# Patient Record
Sex: FEMALE | Race: WHITE | NOT HISPANIC OR LATINO | Employment: FULL TIME | URBAN - METROPOLITAN AREA
[De-identification: names, ages, dates, MRNs, and addresses within clinical notes are randomized per-mention and may not be internally consistent; named-entity substitution may affect disease eponyms.]

---

## 2017-01-16 ENCOUNTER — TRANSFERRED RECORDS (OUTPATIENT)
Dept: HEALTH INFORMATION MANAGEMENT | Facility: CLINIC | Age: 52
End: 2017-01-16

## 2017-04-12 ENCOUNTER — OFFICE VISIT (OUTPATIENT)
Dept: FAMILY MEDICINE | Facility: CLINIC | Age: 52
End: 2017-04-12
Payer: COMMERCIAL

## 2017-04-12 VITALS
RESPIRATION RATE: 16 BRPM | DIASTOLIC BLOOD PRESSURE: 78 MMHG | SYSTOLIC BLOOD PRESSURE: 138 MMHG | TEMPERATURE: 97 F | OXYGEN SATURATION: 97 % | HEART RATE: 78 BPM

## 2017-04-12 DIAGNOSIS — Z23 NEED FOR VACCINATION: ICD-10-CM

## 2017-04-12 DIAGNOSIS — Z71.84 TRAVEL ADVICE ENCOUNTER: Primary | ICD-10-CM

## 2017-04-12 PROCEDURE — 90691 TYPHOID VACCINE IM: CPT | Mod: GA | Performed by: NURSE PRACTITIONER

## 2017-04-12 PROCEDURE — 36415 COLL VENOUS BLD VENIPUNCTURE: CPT | Performed by: NURSE PRACTITIONER

## 2017-04-12 PROCEDURE — 86765 RUBEOLA ANTIBODY: CPT | Performed by: NURSE PRACTITIONER

## 2017-04-12 PROCEDURE — 90715 TDAP VACCINE 7 YRS/> IM: CPT | Mod: GA | Performed by: NURSE PRACTITIONER

## 2017-04-12 PROCEDURE — 86735 MUMPS ANTIBODY: CPT | Performed by: NURSE PRACTITIONER

## 2017-04-12 PROCEDURE — 90471 IMMUNIZATION ADMIN: CPT | Mod: GA | Performed by: NURSE PRACTITIONER

## 2017-04-12 PROCEDURE — 90472 IMMUNIZATION ADMIN EACH ADD: CPT | Mod: GA | Performed by: NURSE PRACTITIONER

## 2017-04-12 PROCEDURE — 99402 PREV MED CNSL INDIV APPRX 30: CPT | Mod: 25 | Performed by: NURSE PRACTITIONER

## 2017-04-12 PROCEDURE — 90636 HEP A/HEP B VACC ADULT IM: CPT | Mod: GA | Performed by: NURSE PRACTITIONER

## 2017-04-12 PROCEDURE — 86762 RUBELLA ANTIBODY: CPT | Performed by: NURSE PRACTITIONER

## 2017-04-12 RX ORDER — AZITHROMYCIN 500 MG/1
500 TABLET, FILM COATED ORAL DAILY
Qty: 3 TABLET | Refills: 0 | Status: SHIPPED | OUTPATIENT
Start: 2017-04-12 | End: 2017-04-15

## 2017-04-12 RX ORDER — ACETAZOLAMIDE 125 MG/1
TABLET ORAL
Qty: 20 TABLET | Refills: 0 | Status: ON HOLD | OUTPATIENT
Start: 2017-04-12 | End: 2017-07-11

## 2017-04-12 NOTE — PATIENT INSTRUCTIONS
Today April 12, 2017 you received the    Twinrix (Hepatitis A & B combo) Vaccine - Please return on 5/12/17 for your 2nd dose and 10/9/17 or later for your 3rd and final dose.    Tetanus (Tdap) Vaccine    Typhoid - injectable. This vaccine is valid for two years.     Blood test for Measles, Mumps and Rubella  .    These appointments can be made as a NURSE ONLY visit.    **It is very important for the vaccinations to be given on the scheduled day(s), this helps ensure you receive the full effectiveness of the vaccine.**    Please call [facility] with any questions [phone]    Thank you for visiting [facility]

## 2017-04-12 NOTE — PROGRESS NOTES
Nurse Note      Itinerary:  [place]       Departure Date: 06/17/2017      Return Date: 06/25/2017      Length of Trip 1 week       Reason for Travel: Tourism           Urban or rural: both      Accommodations: Hotel    Hostel        IMMUNIZATION HISTORY  Have you received any immunizations within the past 4 weeks?  No  Have you ever fainted from having your blood drawn or from an injection?  No  Have you ever had a fever reaction to vaccination?  No  Have you ever had any bad reaction or side effect from any vaccination?  No  Have you ever had hepatitis A or B vaccine?  Yes  Do you live (or work closely) with anyone who has AIDS, an AIDS-like condition, any other immune disorder or who is on chemotherapy for cancer?  No  Do you have a family history of immunodeficiency?  No  Have you received any injection of immune globulin or any blood products during the past 12 months?  No    Patient roomed by KRISTIN Castillo  Lilly Diaz is a 52 year old female seen today alone for counsultation for international travel to [place] for Tourism.  Patient will be departing in  2 month(s) and staying for   1 week(s) and  traveling with friemd.      Patient itinerary :  will be in the [facility] (2 days) [facility] region of [place] which presents risk for Rabies, food borne illnesses, motor vehicle accidents and Typhoid. exposure.      Patient's activities will include sightseeing, hiking and high altitude exposure.    Patient's country of birth is [place]    Special medical concerns: none  Pre-travel questionnaire was completed by patient and reviewed by provider.     Vitals: /78  Pulse 78  Temp 97  F (36.1  C) (Oral)  Resp 16  SpO2 97%  BMI= There is no height or weight on file to calculate BMI.    EXAM:  General:  Well-nourished, well-developed in no acute distress.  Appears to be stated age, interacts appropriately and expresses understanding of information given to patient.    Current  Outpatient Prescriptions   Medication Sig Dispense Refill     azithromycin (ZITHROMAX) 500 MG tablet Take 1 tablet (500 mg) by mouth daily for 3 doses Take 1 tablet a day for up to 3 days for severe diarrhea 3 tablet 0     acetaZOLAMIDE (DIAMOX) 125 MG tablet To prevent altitude illness: Take one to two tab every 12 hours starting 24 hours prior to ascent and continue for 48 hours at highest. 20 tablet 0     doxycycline, Rosacea, (ORACEA) 40 MG CPDR Take 40 mg by mouth daily       FLUOXETINE HCL PO Take 10 mg by mouth daily       Multiple Vitamins-Minerals (MULTIVITAMIN ADULT PO)        Cyanocobalamin (VITAMIN B-12 PO)        MAGNESIUM OXIDE PO        Cetirizine HCl (ZYRTEC ALLERGY PO)        Patient Active Problem List   Diagnosis     Osteopenia     Hypercalcemia     Allergies   Allergen Reactions     Amoxicillin          Immunizations discussed include:   Hepatitis A:  Twin Florinda series started today  Hepatitis B: Twin Florinda series started today  Influenza: Declined  Not concerned about risk of disease  Typhoid: Ordered/given today, risks, benefits and side effects reviewed  Rabies: Declined  Not concerned about risk of disease  Yellow Fever: Not indicated  Japanese Encephalitis: Not indicated  Meningococcus: Not indicated  Tetanus/Diphtheria: Up to date  Measles/Mumps/Rubella: titers  Cholera: Not needed  Polio: Up to date  Pneumococcal: Under age of 65  Varicella: Immune by disease history per patient report  Zostavax:  Not indicated  HPV:  Not indicated  TB:  low    Altitude Exposure on this trip: yes    ASSESSMENT/PLAN:    ICD-10-CM    1. Travel advice encounter Z71.89 TYPHOID VACCINE, IM     HEPA/HEPB VACCINE ADULT IM     HEPA/HEPB VACCINE ADULT IM     Rubella Antibody IgG Quantitative     Rubeola Antibody IgG     Mumps Antibody IgG     TDAP VACCINE (ADACEL)     azithromycin (ZITHROMAX) 500 MG tablet     acetaZOLAMIDE (DIAMOX) 125 MG tablet     CANCELED: HEPA VACCINE ADULT IM   2. Need for vaccination Z23 TYPHOID  VACCINE, IM     HEPA/HEPB VACCINE ADULT IM     HEPA/HEPB VACCINE ADULT IM     TDAP VACCINE (ADACEL)     CANCELED: HEPA VACCINE ADULT IM     I have reviewed general recommendations for safe travel   including: food/water precautions, insect precautions, safer sex   practices given high prevalence of Zika, HIV and other STDs,   roadway safety. Educational materials and [employer] report provided.    Malaraia prophylaxis recommended: none  Symptomatic treatment for traveler's diarrhea: azithromycin  Altitude illness prevention and treatment: Diamox with discussion       Evacuation insurance advised and resources were provided to patient.    Total visit time 30 minutes  with over 50% of time spent counseling patient as detailed above.    Yasmine Quispe CNP

## 2017-04-12 NOTE — MR AVS SNAPSHOT
After Visit Summary   4/12/2017    Lilly Diaz    MRN: [id number]           Patient Information     Date Of Birth          1965        Visit Information        Provider Department      4/12/2017 1:45 PM Yasmine Quispe APRN CNP [facility]        Today's Diagnoses     Travel advice encounter    -  1    Need for vaccination          Care Instructions    Today April 12, 2017 you received the    Twinrix (Hepatitis A & B combo) Vaccine - Please return on 5/12/17 for your 2nd dose and 10/9/17 or later for your 3rd and final dose.    Tetanus (Tdap) Vaccine    Typhoid - injectable. This vaccine is valid for two years.     Blood test for Measles, Mumps and Rubella  .    These appointments can be made as a NURSE ONLY visit.    **It is very important for the vaccinations to be given on the scheduled day(s), this helps ensure you receive the full effectiveness of the vaccine.**    Please call [facility] with any questions [phone]    Thank you for visiting [facility]            Follow-ups after your visit        Future tests that were ordered for you today     Open Standing Orders        Priority Remaining Interval Expires Ordered    HEPA/HEPB VACCINE ADULT IM Routine 2/3  3/12/2018 4/12/2017            Who to contact     If you have questions or need follow up information about today's clinic visit or your schedule please contact [facility] directly at [phone].  Normal or non-critical lab and imaging results will be communicated to you by MyChart, letter or phone within 4 business days after the clinic has received the results. If you do not hear from us within 7 days, please contact the clinic through MyChart or phone. If you have a critical or abnormal lab result, we will notify you by phone as soon as possible.  Submit refill requests through [employer] or call your pharmacy and they will forward the refill request to us.  Please allow 3 business days for your refill to be completed.          Additional Information About Your Visit        MyChart Information     [employer] gives you secure access to your electronic health record. If you see a primary care provider, you can also send messages to your care team and make appointments. If you have questions, please call your primary care clinic.  If you do not have a primary care provider, please call [phone] and they will assist you.        Care EveryWhere ID     This is your Care EveryWhere ID. This could be used by other organizations to access your [place] medical records  [id number]        Your Vitals Were     Pulse Temperature Respirations Pulse Oximetry          78 97  F (36.1  C) (Oral) 16 97%         Blood Pressure from Last 3 Encounters:   04/12/17 138/78   09/01/16 127/78    Weight from Last 3 Encounters:   09/01/16 162 lb (73.5 kg)              We Performed the Following     HEPA/HEPB VACCINE ADULT IM     Mumps Antibody IgG     Rubella Antibody IgG Quantitative     Rubeola Antibody IgG     TDAP VACCINE (ADACEL)     TYPHOID VACCINE, IM          Today's Medication Changes          These changes are accurate as of: 4/12/17  3:30 PM.  If you have any questions, ask your nurse or doctor.               Start taking these medicines.        Dose/Directions    acetaZOLAMIDE 125 MG tablet   Commonly known as:  DIAMOX   Used for:  Travel advice encounter   Started by:  Yasmine Quispe APRN CNP        To prevent altitude illness: Take one to two tab every 12 hours starting 24 hours prior to ascent and continue for 48 hours at highest.   Quantity:  20 tablet   Refills:  0       azithromycin 500 MG tablet   Commonly known as:  ZITHROMAX   Used for:  Travel advice encounter   Started by:  Yasmine Quispe APRN CNP        Dose:  500 mg   Take 1 tablet (500 mg) by mouth daily for 3 doses Take 1 tablet a day for up to 3 days for severe diarrhea   Quantity:  3 tablet   Refills:  0             Where to get your medicines      These medications were sent to [id number] - [facility], [place] - [address] AT [facility] [address]  [address], [facility] [address]     Phone:  [phone]     acetaZOLAMIDE 125 MG tablet    azithromycin 500 MG tablet                Primary Care Provider Office Phone # Fax #    Denny Fong [phone]       [facility] [address]  [facility] [address]        Thank you!     Thank you for choosing [facility]  for your care. Our goal is always to provide you with excellent care. Hearing back from our patients is one way we can continue to improve our services. Please take a few minutes to complete the written survey that you may receive in the mail after your visit with us. Thank you!             Your Updated Medication List - Protect others around you: Learn how to safely use, store and throw away your medicines at www.disposemymeds.org.          This list is accurate as of: 4/12/17  3:30 PM.  Always use your most recent med list.                   Brand Name Dispense Instructions for use    acetaZOLAMIDE 125 MG tablet    DIAMOX    20 tablet    To prevent altitude illness: Take one to two tab every 12 hours starting 24 hours prior to ascent and continue for 48 hours at highest.       azithromycin 500 MG tablet    ZITHROMAX    3 tablet    Take 1 tablet (500 mg) by mouth daily for 3 doses Take 1 tablet a day for up to 3 days for severe diarrhea       doxycycline (Rosacea) 40 MG Cpdr CR capsule    ORACEA     Take 40 mg by mouth daily       FLUOXETINE HCL PO      Take 10 mg by mouth daily       MAGNESIUM OXIDE PO          MULTIVITAMIN ADULT PO          VITAMIN B-12 PO          ZYRTEC ALLERGY PO

## 2017-04-12 NOTE — NURSING NOTE
"Chief Complaint   Patient presents with     Travel Clinic     [place]      /78  Pulse 78  Temp 97  F (36.1  C) (Oral)  Resp 16  SpO2 97% Estimated body mass index is 26.35 kg/(m^2) as calculated from the following:    Height as of 9/1/16: 5' 5.75\" (1.67 m).    Weight as of 9/1/16: 162 lb (73.5 kg).  bp completed using cuff size: regular       Health Maintenance addressed:  NONE    n/a    Emily Torres MA     "

## 2017-04-14 LAB
MEV IGG SER QL IA: 1.6 AI (ref 0–0.8)
MUV IGG SER QL IA: 4.3 AI (ref 0–0.8)
RUBV IGG SERPL IA-ACNC: NORMAL IU/ML

## 2017-04-14 RX ORDER — PHENAZOPYRIDINE HYDROCHLORIDE 200 MG/1
200 TABLET, FILM COATED ORAL ONCE
Status: CANCELLED | OUTPATIENT
Start: 2017-04-14 | End: 2017-04-14

## 2017-04-27 ENCOUNTER — OFFICE VISIT (OUTPATIENT)
Dept: FAMILY MEDICINE | Facility: CLINIC | Age: 52
End: 2017-04-27
Payer: COMMERCIAL

## 2017-04-27 VITALS
HEART RATE: 90 BPM | WEIGHT: 172.7 LBS | OXYGEN SATURATION: 98 % | TEMPERATURE: 96.1 F | HEIGHT: 65 IN | DIASTOLIC BLOOD PRESSURE: 78 MMHG | BODY MASS INDEX: 28.78 KG/M2 | SYSTOLIC BLOOD PRESSURE: 145 MMHG

## 2017-04-27 DIAGNOSIS — I47.10 PAROXYSMAL SUPRAVENTRICULAR TACHYCARDIA (H): Primary | ICD-10-CM

## 2017-04-27 DIAGNOSIS — R94.6 THYROID FUNCTION TEST ABNORMAL: ICD-10-CM

## 2017-04-27 PROCEDURE — 99213 OFFICE O/P EST LOW 20 MIN: CPT | Performed by: INTERNAL MEDICINE

## 2017-04-27 NOTE — MR AVS SNAPSHOT
After Visit Summary   4/27/2017    Lilly Diaz    MRN: [id number]           Patient Information     Date Of Birth          1965        Visit Information        Provider Department      4/27/2017 2:00 PM Denny Fong MD [facility]        Today's Diagnoses     Paroxysmal supraventricular tachycardia (H)    -  1    Thyroid function test abnormal           Follow-ups after your visit        Additional Services     CARDIOLOGY EVAL ADULT REFERRAL       Your provider has referred you to:  [facility]: [facility] [phone]   [email or web address]    Please be aware that coverage of these services is subject to the terms and limitations of your health insurance plan.  Call member services at your health plan with any benefit or coverage questions.      Type of Referral:  New EP Consult    Timeframe requested:  Within 1 month    Dr. De La Cruz if possible     Please bring the following to your appointment:  >>   Any x-rays, CTs or MRIs which have been performed.  Contact the facility where they were done to arrange for  prior to your scheduled appointment.    >>   List of current medications  >>   This referral request   >>   Any documents/labs given to you for this referral                  Follow-up notes from your care team     Return in about 2 months (around 6/27/2017) for Routine Visit with thyroid function tests.      Your next 10 appointments already scheduled     May 12, 2017  3:45 PM CDT   Nurse Only with UP NURSE   [facility] ([facility]    [address]  [facility] [address] [phone]            Jul 03, 2017 10:00 AM CDT   [facility] with [facility]ong [facility]   [facility]    [address] [facility] [address]   [phone]            Jul 11, 2017   Procedure with Keesha Dwyer MD   [facility]  "[facility] (--)    [address]  [place] [address] [phone]              Who to contact     If you have questions or need follow up information about today's clinic visit or your schedule please contact [facility] directly at [phone].  Normal or non-critical lab and imaging results will be communicated to you by MyChart, letter or phone within 4 business days after the clinic has received the results. If you do not hear from us within 7 days, please contact the clinic through [employer] or phone. If you have a critical or abnormal lab result, we will notify you by phone as soon as possible.  Submit refill requests through [employer] or call your pharmacy and they will forward the refill request to us. Please allow 3 business days for your refill to be completed.          Additional Information About Your Visit        [employer] Information     [employer] gives you secure access to your electronic health record. If you see a primary care provider, you can also send messages to your care team and make appointments. If you have questions, please call your primary care clinic.  If you do not have a primary care provider, please call [phone] and they will assist you.        Care EveryWhere ID     This is your Care EveryWhere ID. This could be used by other organizations to access your [place] medical records  [id number]        Your Vitals Were     Pulse Temperature Height Pulse Oximetry Breastfeeding? BMI (Body Mass Index)    90 96.1  F (35.6  C) (Tympanic) 5' 5\" (1.651 m) 98% No 28.74 kg/m2       Blood Pressure from Last 3 Encounters:   04/27/17 145/78   04/12/17 138/78   09/01/16 127/78    Weight from Last 3 Encounters:   04/27/17 172 lb 11.2 oz (78.3 kg)   09/01/16 162 lb (73.5 kg)              We Performed the Following     CARDIOLOGY EVAL ADULT REFERRAL        Primary Care Provider Office Phone # Fax #    Denny Fong [phone]       [facility] " [facility] [address]  [facility] [address]        Thank you!     Thank you for choosing [facility]  for your care. Our goal is always to provide you with excellent care. Hearing back from our patients is one way we can continue to improve our services. Please take a few minutes to complete the written survey that you may receive in the mail after your visit with us. Thank you!             Your Updated Medication List - Protect others around you: Learn how to safely use, store and throw away your medicines at www.disposemymeds.org.          This list is accurate as of: 4/27/17  2:37 PM.  Always use your most recent med list.                   Brand Name Dispense Instructions for use    acetaZOLAMIDE 125 MG tablet    DIAMOX    20 tablet    To prevent altitude illness: Take one to two tab every 12 hours starting 24 hours prior to ascent and continue for 48 hours at highest.       doxycycline (Rosacea) 40 MG Cpdr CR capsule    ORACEA     Take 40 mg by mouth daily       FLUOXETINE HCL PO      Take 10 mg by mouth daily       MAGNESIUM OXIDE PO          MULTIVITAMIN ADULT PO          VITAMIN B-12 PO          ZYRTEC ALLERGY PO

## 2017-04-27 NOTE — NURSING NOTE
"Chief Complaint   Patient presents with     Hospital F/U       Initial /78 (BP Location: Right arm, Patient Position: Chair, Cuff Size: Adult Regular)  Pulse 90  Temp 96.1  F (35.6  C) (Tympanic)  Ht 5' 5\" (1.651 m)  Wt 172 lb 11.2 oz (78.3 kg)  SpO2 98%  Breastfeeding? No  BMI 28.74 kg/m2 Estimated body mass index is 28.74 kg/(m^2) as calculated from the following:    Height as of this encounter: 5' 5\" (1.651 m).    Weight as of this encounter: 172 lb 11.2 oz (78.3 kg).  Medication Reconciliation: genevieve Nichols      "

## 2017-04-27 NOTE — PROGRESS NOTES
SUBJECTIVE:                                                    Lilly Diaz is a 52 year old female who presents to clinic today for the following health issues:      ED/UC Followup:    Facility:  [facility]  Date of visit: 2017  Reason for visit:Supraventricular tachycardia    Current Status: Feeling better       This is a very pleasant 52-year-old female who presented to the [facility] emergency department following the sudden onset of palpitations while at rest. She was found to have a narrow complex tachycardia which resolved with vagal maneuvers. Since emergency department discharge, she has felt well without recurrent symptoms. She was also noted to have a mildly elevated TSH of 6.2 while there.  She notes 3 previous episodes of similar symptoms which resolved spontaneously over the last several years.    Problem list and histories reviewed & adjusted, as indicated.  Additional history: as documented    Patient Active Problem List   Diagnosis     Osteopenia     Hypercalcemia     Paroxysmal supraventricular tachycardia (H)     Past Surgical History:   Procedure Laterality Date     wisdom tooth removal         Social History   Substance Use Topics     Smoking status: Never Smoker     Smokeless tobacco: Not on file     Alcohol use 0.0 oz/week     0 Standard drinks or equivalent per week      Comment: once a week     Family History   Problem Relation Age of Onset     Type 2 Diabetes Father      Coronary Artery Disease Mother       52     Ovarian Cancer Sister       40         Current Outpatient Prescriptions   Medication Sig Dispense Refill     acetaZOLAMIDE (DIAMOX) 125 MG tablet To prevent altitude illness: Take one to two tab every 12 hours starting 24 hours prior to ascent and continue for 48 hours at highest. 20 tablet 0     doxycycline, Rosacea, (ORACEA) 40 MG CPDR Take 40 mg by mouth daily       FLUOXETINE HCL PO Take 10 mg by mouth daily       Multiple Vitamins-Minerals  "(MULTIVITAMIN ADULT PO)        Cyanocobalamin (VITAMIN B-12 PO)        MAGNESIUM OXIDE PO        Cetirizine HCl (ZYRTEC ALLERGY PO)        Allergies   Allergen Reactions     Amoxicillin        Reviewed and updated as needed this visit by clinical staff  Tobacco  Allergies  Meds  Soc Hx      Reviewed and updated as needed this visit by Provider         ROS:  Constitutional, HEENT, cardiovascular, pulmonary, gi and gu systems are negative, except as otherwise noted.    OBJECTIVE:                                                    /78 (BP Location: Right arm, Patient Position: Chair, Cuff Size: Adult Regular)  Pulse 90  Temp 96.1  F (35.6  C) (Tympanic)  Ht 5' 5\" (1.651 m)  Wt 172 lb 11.2 oz (78.3 kg)  SpO2 98%  Breastfeeding? No  BMI 28.74 kg/m2  Body mass index is 28.74 kg/(m^2).  GENERAL: healthy, alert and no distress  RESP: lungs clear to auscultation - no rales, rhonchi or wheezes  CV: regular rate and rhythm, normal S1 S2, no S3 or S4, no murmur, click or rub, no peripheral edema and peripheral pulses strong  ABDOMEN: soft, nontender, no hepatosplenomegaly, no masses and bowel sounds normal  MS: no gross musculoskeletal defects noted, no edema  NEURO: Normal strength and tone, mentation intact and speech normal  PSYCH: mentation appears normal, affect normal/bright    Diagnostic Test Results:  Labs from [facility] reviewed showing TSH of 6.2      ASSESSMENT/PLAN:                                                            1. 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 Thyroid function test abnormal  She is scheduled to see me in 2 months for a preop for nephrectomy which would be an appropriate time to recheck the TSH and other thyroid axis blood " tests.      FUTURE APPOINTMENTS:       - She has a preop visit scheduled for July 2017 at which point her thyroid function tests will be rechecked as well; return sooner as needed    Denny Fong MD  [facility]

## 2017-05-12 ENCOUNTER — ALLIED HEALTH/NURSE VISIT (OUTPATIENT)
Dept: NURSING | Facility: CLINIC | Age: 52
End: 2017-05-12
Payer: COMMERCIAL

## 2017-05-12 DIAGNOSIS — Z23 NEED FOR VACCINATION: ICD-10-CM

## 2017-05-12 DIAGNOSIS — Z71.84 TRAVEL ADVICE ENCOUNTER: ICD-10-CM

## 2017-05-12 PROCEDURE — 90636 HEP A/HEP B VACC ADULT IM: CPT | Mod: GA

## 2017-05-12 PROCEDURE — 99207 ZZC NO CHARGE LOS: CPT

## 2017-05-12 PROCEDURE — 90471 IMMUNIZATION ADMIN: CPT | Mod: GA

## 2017-05-12 NOTE — MR AVS SNAPSHOT
After Visit Summary   5/12/2017    Lilly Diaz    MRN: [id number]           Patient Information     Date Of Birth          1965        Visit Information        Provider Department      5/12/2017 3:45 PM UP NURSE [place] [facility]        Today's Diagnoses     Need for vaccination        Travel advice encounter           Follow-ups after your visit        Your next 10 appointments already scheduled     May 25, 2017  1:30 PM CDT   EP NEW with Vito De La Cruz MD   [facility]YS[facility] AT [place] ([facility])    [address]  [facility] [address]   [phone]            Jul 03, 2017 10:00 AM CDT   [facility]    [address] [facility] [address]   [phone]            Jul 11, 2017   Procedure with Keesha Dwyer MD   [facility] PeriOP Services (--)    [address]  [facility] [address] [phone]            Oct 19, 2017 11:30 AM CDT   Nurse Only with UP NURSE   [place] ([facility])    [address]  [facility] [address] [phone]              Who to contact     If you have questions or need follow up information about today's clinic visit or your schedule please contact [facility] NURSE directly at [phone].  Normal or non-critical lab and imaging results will be communicated to you by MyChart, letter or phone within 4 business days after the clinic has received the results. If you do not hear from us within 7 days, please contact the clinic through [employer] or phone. If you have a critical or abnormal lab result, we will notify you by phone as soon as possible.  Submit refill requests through [employer] or call your pharmacy and they will forward the refill request to us. Please allow 3 business days for your refill to be completed.          Additional  Information About Your Visit        [employer] Information     [employer] gives you secure access to your electronic health record. If you see a primary care provider, you can also send messages to your care team and make appointments. If you have questions, please call your primary care clinic.  If you do not have a primary care provider, please call [phone] and they will assist you.        Care EveryWhere ID     This is your Care EveryWhere ID. This could be used by other organizations to access your [facility] medical records  [id number]         Blood Pressure from Last 3 Encounters:   04/27/17 145/78   04/12/17 138/78   09/01/16 127/78    Weight from Last 3 Encounters:   04/27/17 172 lb 11.2 oz (78.3 kg)   09/01/16 162 lb (73.5 kg)              We Performed the Following     HEPA/HEPB VACCINE ADULT IM        Primary Care Provider Office Phone # Fax #    Denny Fong [phone]       [facility] [address]  [facility] [address]        Thank you!     [facility]. Our goal is always to provide you with excellent care. Hearing back from our patients is one way we can continue to improve our services. Please take a few minutes to complete the written survey that you may receive in the mail after your visit with us. Thank you!             Your Updated Medication List - Protect others around you: Learn how to safely use, store and throw away your medicines at www.disposemymeds.org.          This list is accurate as of: 5/12/17  4:05 PM.  Always use your most recent med list.                   Brand Name Dispense Instructions for use    acetaZOLAMIDE 125 MG tablet    DIAMOX    20 tablet    To prevent altitude illness: Take one to two tab every 12 hours starting 24 hours prior to ascent and continue for 48 hours at highest.       doxycycline (Rosacea) 40 MG Cpdr CR capsule    ORACEA     Take 40 mg by mouth daily       FLUOXETINE HCL PO       Take 10 mg by mouth daily       MAGNESIUM OXIDE PO          MULTIVITAMIN ADULT PO          VITAMIN B-12 PO          ZYRTEC ALLERGY PO

## 2017-05-12 NOTE — PROGRESS NOTES
Screening Questionnaire for Adult Immunization    Are you sick today?   No   Do you have allergies to medications, food, a vaccine component or latex?   No   Have you ever had a serious reaction after receiving a vaccination?   No   Do you have a long-term health problem with heart disease, lung disease, asthma, kidney disease, metabolic disease (e.g. diabetes), anemia, or other blood disorder?   No   Do you have cancer, leukemia, HIV/AIDS, or any other immune system problem?   No   In the past 3 months, have you taken medications that affect  your immune system, such as prednisone, other steroids, or anticancer drugs; drugs for the treatment of rheumatoid arthritis, Crohn s disease, or psoriasis; or have you had radiation treatments?   No   Have you had a seizure, or a brain or other nervous system problem?   No   During the past year, have you received a transfusion of blood or blood     products, or been given immune (gamma) globulin or antiviral drug?   No   For women: Are you pregnant or is there a chance you could become        pregnant during the next month?   No   Have you received any vaccinations in the past 4 weeks?   No     Immunization questionnaire answers were all negative.      MNVFC doesn't apply on this patient  Prior to injection verified patient identity using patient's name and date of birth.    Per orders of CATHERINE Quispe, injection of Twinrix  given by Emily Torres. Patient instructed to remain in clinic for 20 minutes afterwards, and to report any adverse reaction to me immediately.       Screening performed by Emily Torres on 5/12/2017 at 4:03 PM.

## 2017-05-25 ENCOUNTER — OFFICE VISIT (OUTPATIENT)
Dept: CARDIOLOGY | Facility: CLINIC | Age: 52
End: 2017-05-25
Attending: INTERNAL MEDICINE
Payer: COMMERCIAL

## 2017-05-25 VITALS
HEART RATE: 82 BPM | SYSTOLIC BLOOD PRESSURE: 136 MMHG | WEIGHT: 174.8 LBS | BODY MASS INDEX: 29.12 KG/M2 | HEIGHT: 65 IN | DIASTOLIC BLOOD PRESSURE: 82 MMHG

## 2017-05-25 DIAGNOSIS — I47.10 PAROXYSMAL SUPRAVENTRICULAR TACHYCARDIA (H): Primary | ICD-10-CM

## 2017-05-25 PROCEDURE — 99204 OFFICE O/P NEW MOD 45 MIN: CPT | Mod: 25 | Performed by: INTERNAL MEDICINE

## 2017-05-25 PROCEDURE — 93000 ELECTROCARDIOGRAM COMPLETE: CPT | Performed by: INTERNAL MEDICINE

## 2017-05-25 RX ORDER — CEPHRADINE 500 MG
CAPSULE ORAL DAILY
COMMUNITY

## 2017-05-25 RX ORDER — DILTIAZEM HCL 60 MG
TABLET ORAL
Qty: 5 TABLET | Refills: 0 | Status: SHIPPED | OUTPATIENT
Start: 2017-05-25 | End: 2018-04-19

## 2017-05-25 NOTE — PROGRESS NOTES
HPI and Plan:   See dictation    Orders Placed This Encounter   Procedures     EKG 12-lead complete w/read - Clinics (performed today)     Echocardiogram       Orders Placed This Encounter   Medications     Omega-3 Fatty Acids (FISH OIL) 1200 MG CPDR     Sig: Take 1,200 mg by mouth     magnesium 500 MG TABS     Alpha-Lipoic Acid 200 MG CAPS     diltiazem (CARDIZEM) 60 MG tablet     Sig: Take 1 tab as needed for SVT     Dispense:  5 tablet     Refill:  0       There are no discontinued medications.      Encounter Diagnosis   Name Primary?     Paroxysmal supraventricular tachycardia (H) Yes       CURRENT MEDICATIONS:  Current Outpatient Prescriptions   Medication Sig Dispense Refill     Omega-3 Fatty Acids (FISH OIL) 1200 MG CPDR Take 1,200 mg by mouth       magnesium 500 MG TABS        Alpha-Lipoic Acid 200 MG CAPS        diltiazem (CARDIZEM) 60 MG tablet Take 1 tab as needed for SVT 5 tablet 0     acetaZOLAMIDE (DIAMOX) 125 MG tablet To prevent altitude illness: Take one to two tab every 12 hours starting 24 hours prior to ascent and continue for 48 hours at highest. 20 tablet 0     doxycycline, Rosacea, (ORACEA) 40 MG CPDR Take 40 mg by mouth daily       FLUOXETINE HCL PO Take 10 mg by mouth daily       Multiple Vitamins-Minerals (MULTIVITAMIN ADULT PO)        Cyanocobalamin (VITAMIN B-12 PO)        MAGNESIUM OXIDE PO        Cetirizine HCl (ZYRTEC ALLERGY PO)          ALLERGIES     Allergies   Allergen Reactions     Amoxicillin        PAST MEDICAL HISTORY:  Past Medical History:   Diagnosis Date     Menopausal hot flushes     she takes prozac for that     Rosacea     she takes doxycycline for that       PAST SURGICAL HISTORY:  Past Surgical History:   Procedure Laterality Date     wisdom tooth removal         FAMILY HISTORY:  Family History   Problem Relation Age of Onset     Type 2 Diabetes Father      Coronary Artery Disease Mother       52     Ovarian Cancer Sister       40       SOCIAL HISTORY:  Social  "History     Social History     Marital status:      Spouse name: N/A     Number of children: N/A     Years of education: N/A     Social History Main Topics     Smoking status: Never Smoker     Smokeless tobacco: Never Used     Alcohol use 0.0 oz/week     0 Standard drinks or equivalent per week      Comment: once a week     Drug use: No     Sexual activity: Yes     Partners: Male     Other Topics Concern     Parent/Sibling W/ Cabg, Mi Or Angioplasty Before 65f 55m? 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:  Skin:  Negative       Eyes:  Negative      ENT:  Negative      Respiratory:  Positive for cough;shortness of breath (SOB w/palpitations ) r/t allergies    Cardiovascular:    Positive for;palpitations;lightheadedness    Gastroenterology: Positive for   abd \"tightness\" w/palpitations   Genitourinary:  Negative      Musculoskeletal:  Negative      Neurologic:  Negative      Psychiatric:  Positive for sleep disturbances;anxiety    Heme/Lymph/Imm:  Negative      Endocrine:  Positive for hot flashes (Elevated TSH in ED, PMD to recheck in July 2017) patient r/t menopause     [id number]      Denny Fong MD  [facility]  [address] [address]              "

## 2017-05-25 NOTE — LETTER
2017    Denny Fong MD  [address] [place] [address]    RE: Lilly Diaz       Dear Colleague,    I had the pleasure of seeing Lilly Diaz in the [facility].    It was my pleasure seeing your patient, Ms. Lilly Diaz, for evaluation of SVT.  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  The patient was 90 minutes into an episode of profound heart racing and pounding when she decided to go to the ER.  She was mildly lightheaded.   In the ER, she was described to have a regular narrow QRS tachycardia in the 190s.  The episode terminated with Valsalva maneuvers.  I do not have a 12-lead ECG.  The patient was asked to follow up with you.  Lilly had at least 3 or 4 similar episodes over the past 2 or 3 years.  Each of the previous episodes terminated spontaneously within minutes.      She is active.  In fact, a few years ago she was extremely active and as recently as in  she ran a full marathon.  Since menopause she has decreased her activity level.  She is scheduled to have prophylactic oophorectomy in July.      She is a non-smoker and drinks alcohol socially.  She drinks very modest amounts of caffeinated beverages.      Lilly's mother had a heart attack and  in her early 50s.  She was a smoker.      PHYSICAL EXAMINATION:   VITAL SIGNS:  Blood pressure 136/82, pulse 82 and regular, weight 79 kg, height 165 cm.   GENERAL:  She is a very pleasant, healthy-appearing female in no distress.   HEENT:  Normocephalic, atraumatic.  Sclerae nonicteric.  Mouth, oropharynx clear.   NECK:  Supple, without thyromegaly, lymphadenopathy or carotid bruits.   LUNGS:  Clear.  No crackles or wheezes.   CARDIOVASCULAR:  Normal JVP, regular rhythm.  No gallop, murmur or rub.   ABDOMEN:  Soft, nontender.   EXTREMITIES:  No edema.   SKIN:  No rash.   BACK:  No CVA tenderness.    NEUROLOGIC:  Alert and oriented x3.      DIAGNOSTIC STUDIES:    Her 12-lead ECG today showed sinus rhythm with RSR' in lead V1, otherwise normal.   She had recent laboratory tests showing sodium 139, potassium 4.1, creatinine 0.86, , HDL 46, cholesterol 195, triglycerides 112.   She recently had a heart scan with a calcium score of 0.     Outpatient Encounter Prescriptions as of 5/25/2017   Medication Sig Dispense Refill     Omega-3 Fatty Acids (FISH OIL) 1200 MG CPDR Take 1,200 mg by mouth       magnesium 500 MG TABS Take 1 tablet by mouth daily        Alpha-Lipoic Acid 200 MG CAPS        diltiazem (CARDIZEM) 60 MG tablet Take 1 tab as needed for SVT 5 tablet 0     [DISCONTINUED] acetaZOLAMIDE (DIAMOX) 125 MG tablet To prevent altitude illness: Take one to two tab every 12 hours starting 24 hours prior to ascent and continue for 48 hours at highest. 20 tablet 0     doxycycline, Rosacea, (ORACEA) 40 MG CPDR Take 40 mg by mouth daily       FLUOXETINE HCL PO Take 10 mg by mouth daily       Multiple Vitamins-Minerals (MULTIVITAMIN ADULT PO) Take 1 tablet by mouth daily        Cyanocobalamin (VITAMIN B-12 PO) Take 1,000 mcg by mouth daily        Cetirizine HCl (ZYRTEC ALLERGY PO) Take 10 mg by mouth daily        [DISCONTINUED] MAGNESIUM OXIDE PO        No facility-administered encounter medications on file as of 5/25/2017.       IMPRESSION:     1.  Paroxysmal supraventricular tachycardia.  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  She had a prolonged and symptomatic episode recently.  She had previous shorter episodes that terminated within minutes.  We had a good discussion about the pathophysiology and treatment options for SVT.  We discussed the following three options:   a.  Observation and application of Valsalva maneuvers if she has a recurrence.   b.  Daily pharmacologic therapy with a calcium channel blocker or beta blocker.   c.  Catheter ablation of SVT.  With regard to the EP procedure, we went over the  technical aspects, risks and benefits of catheter ablation.  I quoted a greater than 95% likelihood of cure of reentrant SVT with a single procedure with the risk of serious complication of 1% to 2%.  Potential complications include, but are not limited to vascular injury, DVT, bleeding, cardiac perforation with tamponade, injury to the conduction system requiring pacemaker implantation, TIA or CVA if left-sided ablation is required and other unforeseen complications.      Lilly had several good questions.  She is leaning toward catheter ablation but she wanted to further discuss this with her family.      RECOMMENDATIONS:   A.  Echocardiogram.   B.  She is leaving for a trip to [place] in mid June.  I prescribed short-acting diltiazem 60 mg tablets to have available and take as needed in case she develops sustained SVT during her trip.      It was my pleasure seeing this delightful patient.  Please feel free to contact me with any additional questions.     Sincerely,    Vito De La Cruz MD     [facility]

## 2017-05-25 NOTE — MR AVS SNAPSHOT
After Visit Summary   5/25/2017    Lilly Diaz    MRN: [id number]           Patient Information     Date Of Birth          1965        Visit Information        Provider Department      5/25/2017 1:30 PM Vito De La Cruz MD [facility]YS[facility] AT [place]        Today's Diagnoses     Paroxysmal supraventricular tachycardia (H)    -  1       Follow-ups after your visit        Your next 10 appointments already scheduled     Jun 02, 2017  3:00 PM CDT   Ech Complete with [id number]   [facility] CV Echocardiography (Cardiovascular Imaging at [facility])    [address] [facility]  [address]  [facility] [address] [phone]           1.  Please bring or wear a comfortable two-piece outfit. 2.  You may eat, drink and take your normal medicines. 3.  For any questions that cannot be answered, please contact the ordering physician            Jul 03, 2017 10:00 AM CDT   Pre-Op physical with Denny Fong MD   [facility]    [address] [facility] [address] [phone]            Jul 11, 2017   Procedure with Keesha Dwyer MD   [facility] PeriOP Services (--)    [address]  [facility] [address] [phone]            Oct 19, 2017 11:30 AM CDT   Nurse Only with UP NURSE   [place] ([facility])    [address]  [facility] [address] [phone]              Future tests that were ordered for you today     Open Future Orders        Priority Expected Expires Ordered    Echocardiogram Routine 6/1/2017 5/25/2018 5/25/2017            Who to contact     If you have questions or need follow up information about today's clinic visit or your schedule please contact [facility] [place] directly at [phone].  Normal or non-critical lab and imaging results will be communicated to you by Annabel  "letter or phone within 4 business days after the clinic has received the results. If you do not hear from us within 7 days, please contact the clinic through [employer] or phone. If you have a critical or abnormal lab result, we will notify you by phone as soon as possible.  Submit refill requests through [employer] or call your pharmacy and they will forward the refill request to us. Please allow 3 business days for your refill to be completed.          Additional Information About Your Visit        [employer] Information     [employer] gives you secure access to your electronic health record. If you see a primary care provider, you can also send messages to your care team and make appointments. If you have questions, please call your primary care clinic.  If you do not have a primary care provider, please call [phone] and they will assist you.        Care EveryWhere ID     This is your Care EveryWhere ID. This could be used by other organizations to access your [facility] medical records  [id number]        Your Vitals Were     Pulse Height BMI (Body Mass Index)             82 1.651 m (5' 5\") 29.09 kg/m2          Blood Pressure from Last 3 Encounters:   05/25/17 136/82   04/27/17 145/78   04/12/17 138/78    Weight from Last 3 Encounters:   05/25/17 79.3 kg (174 lb 12.8 oz)   04/27/17 78.3 kg (172 lb 11.2 oz)   09/01/16 73.5 kg (162 lb)              We Performed the Following     EKG 12-lead complete w/read - Clinics (performed today)          Today's Medication Changes          These changes are accurate as of: 5/25/17  2:07 PM.  If you have any questions, ask your nurse or doctor.               Start taking these medicines.        Dose/Directions    diltiazem 60 MG tablet   Commonly known as:  CARDIZEM   Used for:  Paroxysmal supraventricular tachycardia (H)   Started by:  Vito De La Cruz MD        Take 1 tab as needed for SVT   Quantity:  5 tablet   Refills:  0            Where to get your medicines    "   These medications were sent to [id number] - [place], [place] - [address] [facility] [address]  [address], [facility] [address]     Phone:  [phone]     diltiazem 60 MG tablet                Primary Care Provider Office Phone # Fax #    Denny Fong [phone]       [place] [id number] [facility] [address]        Thank you!     Thank you for choosing [facility]YSIC[facility] HE[facility] AT [place]  for your care. Our goal is always to provide you with excellent care. Hearing back from our patients is one way we can continue to improve our services. Please take a few minutes to complete the written survey that you may receive in the mail after your visit with us. Thank you!             Your Updated Medication List - Protect others around you: Learn how to safely use, store and throw away your medicines at www.disposemymeds.org.          This list is accurate as of: 5/25/17  2:07 PM.  Always use your most recent med list.                   Brand Name Dispense Instructions for use    acetaZOLAMIDE 125 MG tablet    DIAMOX    20 tablet    To prevent altitude illness: Take one to two tab every 12 hours starting 24 hours prior to ascent and continue for 48 hours at highest.       Alpha-Lipoic Acid 200 MG Caps          diltiazem 60 MG tablet    CARDIZEM    5 tablet    Take 1 tab as needed for SVT       doxycycline (Rosacea) 40 MG Cpdr CR capsule    ORACEA     Take 40 mg by mouth daily       Fish Oil 1200 MG Cpdr      Take 1,200 mg by mouth       FLUOXETINE HCL PO      Take 10 mg by mouth daily       magnesium 500 MG Tabs          MAGNESIUM OXIDE PO          MULTIVITAMIN ADULT PO          VITAMIN B-12 PO          ZYRTEC ALLERGY PO

## 2017-05-26 NOTE — PROGRESS NOTES
HISTORY OF PRESENT ILLNESS:    It was my pleasure seeing your patient, Ms. Lilly Diaz, for evaluation of SVT.  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  The patient was 90 minutes into an episode of profound heart racing and pounding when she decided to go to the ER.  She was mildly lightheaded.   In the ER, she was described to have a regular narrow QRS tachycardia in the 190s.  The episode terminated with Valsalva maneuvers.  I do not have a 12-lead ECG.  The patient was asked to follow up with you.  Lilly had at least 3 or 4 similar episodes over the past 2 or 3 years.  Each of the previous episodes terminated spontaneously within minutes.      She is active.  In fact, a few years ago she was extremely active and as recently as in  she ran a full marathon.  Since menopause she has decreased her activity level.  She is scheduled to have prophylactic oophorectomy in July.      She is a non-smoker and drinks alcohol socially.  She drinks very modest amounts of caffeinated beverages.      Lilly's mother had a heart attack and  in her early 50s.  She was a smoker.      PHYSICAL EXAMINATION:   VITAL SIGNS:  Blood pressure 136/82, pulse 82 and regular, weight 79 kg, height 165 cm.   GENERAL:  She is a very pleasant, healthy-appearing female in no distress.   HEENT:  Normocephalic, atraumatic.  Sclerae nonicteric.  Mouth, oropharynx clear.   NECK:  Supple, without thyromegaly, lymphadenopathy or carotid bruits.   LUNGS:  Clear.  No crackles or wheezes.   CARDIOVASCULAR:  Normal JVP, regular rhythm.  No gallop, murmur or rub.   ABDOMEN:  Soft, nontender.   EXTREMITIES:  No edema.   SKIN:  No rash.   BACK:  No CVA tenderness.   NEUROLOGIC:  Alert and oriented x3.      DIAGNOSTIC STUDIES:    Her 12-lead ECG today showed sinus rhythm with RSR' in lead V1, otherwise normal.   She had recent laboratory tests showing sodium  139, potassium 4.1, creatinine 0.86, , HDL 46, cholesterol 195, triglycerides 112.   She recently had a heart scan with a calcium score of 0.      IMPRESSION:     1.  Paroxysmal supraventricular tachycardia.  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  She had a prolonged and symptomatic episode recently.  She had previous shorter episodes that terminated within minutes.  We had a good discussion about the pathophysiology and treatment options for SVT.  We discussed the following three options:   a.  Observation and application of Valsalva maneuvers if she has a recurrence.   b.  Daily pharmacologic therapy with a calcium channel blocker or beta blocker.   c.  Catheter ablation of SVT.  With regard to the EP procedure, we went over the technical aspects, risks and benefits of catheter ablation.  I quoted a greater than 95% likelihood of cure of reentrant SVT with a single procedure with the risk of serious complication of 1% to 2%.  Potential complications include, but are not limited to vascular injury, DVT, bleeding, cardiac perforation with tamponade, injury to the conduction system requiring pacemaker implantation, TIA or CVA if left-sided ablation is required and other unforeseen complications.      Lilly had several good questions.  She is leaning toward catheter ablation but she wanted to further discuss this with her family.      RECOMMENDATIONS:   A.  Echocardiogram.   B.  She is leaving for a trip to [place] in mid June.  I prescribed short-acting diltiazem 60 mg tablets to have available and take as needed in case she develops sustained SVT during her trip.      It was my pleasure seeing this delightful patient.  Please feel free to contact me with any additional questions.        CONSUELO GODINEZ MD, [facility]         cc:   Denny Fong MD   [address]  [address]          D: 05/25/2017 14:13   T: 05/26/2017 11:47   MT: LAWRENCE      Name:      YAYA LOLLY   MRN:      [id number]        Account:      [id number]   :      1965           Service Date: 2017      Document: [id number]

## 2017-06-02 ENCOUNTER — HOSPITAL ENCOUNTER (OUTPATIENT)
Dept: CARDIOLOGY | Facility: CLINIC | Age: 52
Discharge: HOME | End: 2017-06-02
Attending: INTERNAL MEDICINE | Admitting: INTERNAL MEDICINE
Payer: COMMERCIAL

## 2017-06-02 DIAGNOSIS — I47.10 PAROXYSMAL SUPRAVENTRICULAR TACHYCARDIA (H): ICD-10-CM

## 2017-06-02 PROCEDURE — 93306 TTE W/DOPPLER COMPLETE: CPT | Mod: 26 | Performed by: INTERNAL MEDICINE

## 2017-06-02 PROCEDURE — 93306 TTE W/DOPPLER COMPLETE: CPT

## 2017-06-05 ENCOUNTER — TELEPHONE (OUTPATIENT)
Dept: CARDIOLOGY | Facility: CLINIC | Age: 52
End: 2017-06-05

## 2017-06-05 NOTE — TELEPHONE ENCOUNTER
Message  Received: Yesterday       Vito De La Cruz MD  [facility] Heart Ep Nurse                     Normal echo-.   Please let her know.   JONATHAN Wolf       Writer called pt with results as above and she verbalized understanding. Still deciding if she would like to pursue catheter ablation or not. Will call us back with her decision. FELECIA Oleary RN.

## 2017-06-16 DIAGNOSIS — I47.10 PAROXYSMAL SUPRAVENTRICULAR TACHYCARDIA (H): Primary | ICD-10-CM

## 2017-07-03 ENCOUNTER — OFFICE VISIT (OUTPATIENT)
Dept: FAMILY MEDICINE | Facility: CLINIC | Age: 52
End: 2017-07-03
Payer: COMMERCIAL

## 2017-07-03 VITALS
DIASTOLIC BLOOD PRESSURE: 73 MMHG | SYSTOLIC BLOOD PRESSURE: 116 MMHG | TEMPERATURE: 97.9 F | WEIGHT: 177 LBS | HEIGHT: 65 IN | HEART RATE: 86 BPM | OXYGEN SATURATION: 100 % | BODY MASS INDEX: 29.49 KG/M2

## 2017-07-03 DIAGNOSIS — E03.9 HYPOTHYROIDISM, UNSPECIFIED TYPE: ICD-10-CM

## 2017-07-03 DIAGNOSIS — I47.10 PAROXYSMAL SUPRAVENTRICULAR TACHYCARDIA (H): ICD-10-CM

## 2017-07-03 DIAGNOSIS — N95.1 SYMPTOMATIC MENOPAUSAL OR FEMALE CLIMACTERIC STATES: ICD-10-CM

## 2017-07-03 DIAGNOSIS — Z01.818 PREOP GENERAL PHYSICAL EXAM: Primary | ICD-10-CM

## 2017-07-03 LAB
ANION GAP SERPL CALCULATED.3IONS-SCNC: 7 MMOL/L (ref 3–14)
BUN SERPL-MCNC: 16 MG/DL (ref 7–30)
CALCIUM SERPL-MCNC: 9.2 MG/DL (ref 8.5–10.1)
CHLORIDE SERPL-SCNC: 106 MMOL/L (ref 94–109)
CO2 SERPL-SCNC: 26 MMOL/L (ref 20–32)
CREAT SERPL-MCNC: 0.93 MG/DL (ref 0.52–1.04)
ERYTHROCYTE [DISTWIDTH] IN BLOOD BY AUTOMATED COUNT: 13.5 % (ref 10–15)
GFR SERPL CREATININE-BSD FRML MDRD: 63 ML/MIN/1.7M2
GLUCOSE SERPL-MCNC: 85 MG/DL (ref 70–99)
HCT VFR BLD AUTO: 42.7 % (ref 35–47)
HGB BLD-MCNC: 14.3 G/DL (ref 11.7–15.7)
MCH RBC QN AUTO: 28.9 PG (ref 26.5–33)
MCHC RBC AUTO-ENTMCNC: 33.5 G/DL (ref 31.5–36.5)
MCV RBC AUTO: 86 FL (ref 78–100)
PLATELET # BLD AUTO: 243 10E9/L (ref 150–450)
POTASSIUM SERPL-SCNC: 4.4 MMOL/L (ref 3.4–5.3)
RBC # BLD AUTO: 4.95 10E12/L (ref 3.8–5.2)
SODIUM SERPL-SCNC: 139 MMOL/L (ref 133–144)
TSH SERPL DL<=0.005 MIU/L-ACNC: 3.09 MU/L (ref 0.4–4)
WBC # BLD AUTO: 6.1 10E9/L (ref 4–11)

## 2017-07-03 PROCEDURE — 99214 OFFICE O/P EST MOD 30 MIN: CPT | Performed by: INTERNAL MEDICINE

## 2017-07-03 PROCEDURE — 84443 ASSAY THYROID STIM HORMONE: CPT | Performed by: INTERNAL MEDICINE

## 2017-07-03 PROCEDURE — 80048 BASIC METABOLIC PNL TOTAL CA: CPT | Performed by: INTERNAL MEDICINE

## 2017-07-03 PROCEDURE — 85027 COMPLETE CBC AUTOMATED: CPT | Performed by: INTERNAL MEDICINE

## 2017-07-03 PROCEDURE — 36415 COLL VENOUS BLD VENIPUNCTURE: CPT | Performed by: INTERNAL MEDICINE

## 2017-07-03 NOTE — MR AVS SNAPSHOT
After Visit Summary   7/3/2017    Lilly Diaz    MRN: [id number]           Patient Information     Date Of Birth          1965        Visit Information        Provider Department      7/3/2017 10:00 AM Denny Fong MD [facility]        Today's Diagnoses     Preop general physical exam    -  1    Paroxysmal supraventricular tachycardia (H)        Symptomatic menopausal or female climacteric states        Hypothyroidism, unspecified type          Care Instructions      Before Your Surgery      Call your surgeon if there is any change in your health. This includes signs of a cold or flu (such as a sore throat, runny nose, cough, rash or fever).    Do not smoke, drink alcohol or take over the counter medicine (unless your surgeon or primary care doctor tells you to) for the 24 hours before and after surgery.    If you take prescribed drugs: Follow your doctor s orders about which medicines to take and which to stop until after surgery.    Eating and drinking prior to surgery: follow the instructions from your surgeon    Take a shower or bath the night before surgery. Use the soap your surgeon gave you to gently clean your skin. If you do not have soap from your surgeon, use your regular soap. Do not shave or scrub the surgery site.  Wear clean pajamas and have clean sheets on your bed.           Follow-ups after your visit        Your next 10 appointments already scheduled     Jul 11, 2017   [facility]    [address]  [facility] [address]   [phone]            Oct 19, 2017 11:30 AM CDT   Nurse Only with UP NURSE   [facility] ([facility]    [address]  [facility] [address] [phone]              Who to contact     If you have questions or need follow up information about today's clinic visit or your schedule please contact [facility]  "directly at [phone].  Normal or non-critical lab and imaging results will be communicated to you by Planspothart, letter or phone within 4 business days after the clinic has received the results. If you do not hear from us within 7 days, please contact the clinic through Art Circlet or phone. If you have a critical or abnormal lab result, we will notify you by phone as soon as possible.  Submit refill requests through Positronics or call your pharmacy and they will forward the refill request to us. Please allow 3 business days for your refill to be completed.          Additional Information About Your Visit        PlanspotharPins Information     Pos[employer]ics gives you secure access to your electronic health record. If you see a primary care provider, you can also send messages to your care team and make appointments. If you have questions, please call your primary care clinic.  If you do not have a primary care provider, please call [phone] and they will assist you.        Care EveryWhere ID     This is your Care EveryWhere ID. This could be used by other organizations to access your [place] medical records  [id number]        Your Vitals Were     Pulse Temperature Height Pulse Oximetry BMI (Body Mass Index)       86 97.9  F (36.6  C) (Oral) 5' 5\" (1.651 m) 100% 29.45 kg/m2        Blood Pressure from Last 3 Encounters:   07/03/17 116/73   05/25/17 136/82   04/27/17 145/78    Weight from Last 3 Encounters:   07/03/17 177 lb (80.3 kg)   05/25/17 174 lb 12.8 oz (79.3 kg)   04/27/17 172 lb 11.2 oz (78.3 kg)              We Performed the Following     Basic metabolic panel  (Ca, Cl, CO2, Creat, Gluc, K, Na, BUN)     CBC with platelets     TSH with free T4 reflex          Today's Medication Changes          These changes are accurate as of: 7/3/17 10:34 AM.  If you have any questions, ask your nurse or doctor.               Stop taking these medicines if you haven't already. Please contact your care team if you have questions.     " MAGNESIUM OXIDE PO   Stopped by:  Denny Fong MD                    Primary Care Provider Office Phone # Fax #    Denny Fong [phone]       [facility] [address]  [facility] [address]        Equal Access to Services     [facility]: Hadii aad ku hadasho Soomaali, waaxda luqadaha, qaybta kaalmada adeegyada, waxay idiin hayaan adeeg khgrupo la'rainn . So [facility] [phone].    ATENCIÓN: Si habla español, tiene a castelan disposición servicios gratuitos de asistencia lingüística. Llame al [phone].    We comply with applicable federal civil rights laws and [place] laws. We do not discriminate on the basis of race, color, national origin, age, disability sex, sexual orientation or gender identity.            Thank you!     Thank you for choosing [facility]  for your care. Our goal is always to provide you with excellent care. Hearing back from our patients is one way we can continue to improve our services. Please take a few minutes to complete the written survey that you may receive in the mail after your visit with us. Thank you!             Your Updated Medication List - Protect others around you: Learn how to safely use, store and throw away your medicines at www.disposemymeds.org.          This list is accurate as of: 7/3/17 10:34 AM.  Always use your most recent med list.                   Brand Name Dispense Instructions for use Diagnosis    acetaZOLAMIDE 125 MG tablet    DIAMOX    20 tablet    To prevent altitude illness: Take one to two tab every 12 hours starting 24 hours prior to ascent and continue for 48 hours at highest.    Travel advice encounter       Alpha-Lipoic Acid 200 MG Caps           diltiazem 60 MG tablet    CARDIZEM    5 tablet    Take 1 tab as needed for SVT    Paroxysmal supraventricular tachycardia (H)       doxycycline (Rosacea) 40 MG Cpdr CR capsule    ORACEA     Take 40 mg by mouth daily        Fish Oil 1200 MG Cpdr      Take 1,200 mg  by mouth        FLUOXETINE HCL PO      Take 10 mg by mouth daily        magnesium 500 MG Tabs      Take 1 tablet by mouth daily        MULTIVITAMIN ADULT PO      Take 1 tablet by mouth daily        VITAMIN B-12 PO      Take 1 tablet by mouth daily        ZYRTEC ALLERGY PO      Take 10 mg by mouth daily

## 2017-07-03 NOTE — NURSING NOTE
"Chief Complaint   Patient presents with     Pre-Op Exam       Initial /73 (BP Location: Left arm, Cuff Size: Adult Regular)  Pulse 86  Temp 97.9  F (36.6  C) (Oral)  Ht 5' 5\" (1.651 m)  Wt 177 lb (80.3 kg)  SpO2 100%  BMI 29.45 kg/m2 Estimated body mass index is 29.45 kg/(m^2) as calculated from the following:    Height as of this encounter: 5' 5\" (1.651 m).    Weight as of this encounter: 177 lb (80.3 kg).  Medication Reconciliation: complete.      Sherry Suarez CMA      "

## 2017-07-03 NOTE — PROGRESS NOTES
"[facility]  [address] [facility] [address]  [phone]  Dept: [phone]    PRE-OP EVALUATION:  Today's date: 7/3/2017    Lilly Diaz (: 1965) presents for pre-operative evaluation assessment as requested by Dr. Dwyer, Keesha Contreras and Lorelei Lundy, .  She requires evaluation and anesthesia risk assessment prior to undergoing surgery/procedure for prevention purposes.  Proposed procedure: LAPAROSCOPIC SALPINGO-OOPHORECTOMY    Date of Surgery/ Procedure: 2017  Time of Surgery/ Procedure: 9:00 AM  Hospital/Surgical Facility:  OR  Fax number for surgical facility:   Primary Physician: Denny Fong  Type of Anesthesia Anticipated: General    Patient has a Health Care Directive or Living Will:  NO    Preop Questions 2017   1.  Do you have a history of heart attack, stroke, stent, bypass or surgery on an artery in the head, neck, heart or legs? No   2.  Do you ever have any pain or discomfort in your chest? YES - rare intermittent episodes of SVT; recent evaluated by EP; using diltiazem \"pill in pocket\" strategy to manage symptoms    3.  Do you have a history of  Heart Failure? No   4.   Are you troubled by shortness of breath when:  walking on a level surface, or up a slight hill, or at night? No   5.  Do you currently have a cold, bronchitis or other respiratory infection? No   6.  Do you have a cough, shortness of breath, or wheezing? No   7.  Do you sometimes get pains in the calves of your legs when you walk? No   8. Do you or anyone in your family have previous history of blood clots? No   9.  Do you or does anyone in your family have a serious bleeding problem such as prolonged bleeding following surgeries or cuts? No   10. Have you ever had problems with anemia or been told to take iron pills? No   11. Have you had any abnormal blood loss such as black, tarry or bloody stools, or abnormal vaginal bleeding? No   12. Have you ever had a blood transfusion? " No   13. Have you or any of your relatives ever had problems with anesthesia? No   14. Do you have sleep apnea, excessive snoring or daytime drowsiness? No   15. Do you have any prosthetic heart valves? No   16. Do you have prosthetic joints? No   17. Is there any chance that you may be pregnant? No         HPI:                                                      Brief HPI related to upcoming procedure: Family history of ovarian cancer.  Patient seeking prophylactic oophorectomy         MEDICAL HISTORY:                                                      Patient Active Problem List    Diagnosis Date Noted     Symptomatic menopausal or female climacteric states 07/03/2017     Priority: Medium     Paroxysmal supraventricular tachycardia (H) 04/27/2017     Priority: Medium     Osteopenia 09/01/2016     Priority: Medium     Hypercalcemia 09/01/2016     Priority: Medium      Past Medical History:   Diagnosis Date     Menopausal hot flushes     she takes prozac for that     Rosacea     she takes doxycycline for that     Past Surgical History:   Procedure Laterality Date     wisdom tooth removal       Current Outpatient Prescriptions   Medication Sig Dispense Refill     Omega-3 Fatty Acids (FISH OIL) 1200 MG CPDR Take 1,200 mg by mouth       magnesium 500 MG TABS Take 1 tablet by mouth daily        Alpha-Lipoic Acid 200 MG CAPS        diltiazem (CARDIZEM) 60 MG tablet Take 1 tab as needed for SVT 5 tablet 0     acetaZOLAMIDE (DIAMOX) 125 MG tablet To prevent altitude illness: Take one to two tab every 12 hours starting 24 hours prior to ascent and continue for 48 hours at highest. 20 tablet 0     doxycycline, Rosacea, (ORACEA) 40 MG CPDR Take 40 mg by mouth daily       FLUOXETINE HCL PO Take 10 mg by mouth daily       Multiple Vitamins-Minerals (MULTIVITAMIN ADULT PO) Take 1 tablet by mouth daily        Cyanocobalamin (VITAMIN B-12 PO) Take 1 tablet by mouth daily        Cetirizine HCl (ZYRTEC ALLERGY PO) Take 10 mg by  "mouth daily        OTC products: None, except as noted above    Allergies   Allergen Reactions     Amoxicillin       Latex Allergy: NO    Social History   Substance Use Topics     Smoking status: Never Smoker     Smokeless tobacco: Never Used     Alcohol use 0.0 oz/week     0 Standard drinks or equivalent per week      Comment: once a week     History   Drug Use No       REVIEW OF SYSTEMS:                                                    Constitutional, neuro, ENT, endocrine, pulmonary, cardiac, gastrointestinal, genitourinary, musculoskeletal, integument and psychiatric systems are negative, except as otherwise noted.    EXAM:                                                    /73 (BP Location: Left arm, Cuff Size: Adult Regular)  Pulse 86  Temp 97.9  F (36.6  C) (Oral)  Ht 5' 5\" (1.651 m)  Wt 177 lb (80.3 kg)  SpO2 100%  BMI 29.45 kg/m2    GENERAL APPEARANCE: healthy, alert and no distress     EYES: EOMI, PERRL     HENT: ear canals and TM's normal and nose and mouth without ulcers or lesions     NECK: no adenopathy, no asymmetry, masses, or scars and thyroid normal to palpation     RESP: lungs clear to auscultation - no rales, rhonchi or wheezes     CV: regular rates and rhythm, normal S1 S2, no S3 or S4 and no murmur, click or rub     ABDOMEN:  soft, nontender, no HSM or masses and bowel sounds normal     MS: extremities normal- no gross deformities noted, no evidence of inflammation in joints, FROM in all extremities.     SKIN: no suspicious lesions or rashes     NEURO: Normal strength and tone, sensory exam grossly normal, mentation intact and speech normal     PSYCH: mentation appears normal. and affect normal/bright     LYMPHATICS: No axillary, cervical, or supraclavicular nodes    DIAGNOSTICS:                                                    EK2017 Sinus rhythm not ST segment changes    Recent Labs   Lab Test  16   1825   NA  139   POTASSIUM  4.1   CR  0.86        IMPRESSION:       "                                              Reason for surgery/procedure: Family history of ovarian cancer ; prophylactic oophorectomy   Diagnosis/reason for consult: Preoperative clearance     The proposed surgical procedure is considered LOW risk.    REVISED CARDIAC RISK INDEX  The patient has the following serious cardiovascular risks for perioperative complications such as (MI, PE, VFib and 3  AV Block):  No serious cardiac risks  INTERPRETATION: 0 risks: Class I (very low risk - 0.4% complication rate)    The patient has the following additional risks for perioperative complications:  No identified additional risks      ICD-10-CM    1. Preop general physical exam Z01.818    2. Paroxysmal supraventricular tachycardia (H) I47.1    3. Symptomatic menopausal or female climacteric states N95.1    4. Hypothyroidism, unspecified type E03.9 TSH with free T4 reflex     SVT symptoms stable; she will consider ablation in the future if needed, but for now the disease is well managed with diltiazem as needed    She has been taking prozac for hot flushes, but we discussed a tapering strategy to get off that drug, she does not like the weight gain side effects    Her TSH was high at  [facility] in April, recheck today     RECOMMENDATIONS:                                                        --Patient is to take all scheduled medications on the day of surgery with a sip of water    APPROVAL GIVEN to proceed with proposed procedure, without further diagnostic evaluation       Signed Electronically by: Denny Fong MD    Copy of this evaluation report is provided to requesting physician.    [place] Preop Guidelines

## 2017-07-03 NOTE — LETTER
[address]  [address]  [place]  [address]  Tel: [phone]    July 5, 2017    Lilly Diaz  [address] [place] [address]        Dear Ms. Diaz,    The following letter pertains to your most recent diagnostic tests:     -TSH (thyroid stimulating hormone) level is normal which indicates normal circulating thyroid hormone levels.       -Kidney function is normal for you (Creatinine, GFR), Sodium is normal for you, Potassium is normal for you, Calcium is normal for you, Glucose (blood sugar) is normal for you.     -Your complete blood counts including your hemoglobin returned normal for you.         Bottom line:  Your repeat thyroid test is normal. Your other lab results look OK for surgery.       If you have any further questions or problems, please contact our office.      Sincerely,    Denny Fong MD          Enclosure: Lab Results                      Results for orders placed or performed in visit on 07/03/17   TSH with free T4 reflex   Result Value Ref Range    TSH 3.09 0.40 - 4.00 mU/L   CBC with platelets   Result Value Ref Range    WBC 6.1 4.0 - 11.0 10e9/L    RBC Count 4.95 3.8 - 5.2 10e12/L    Hemoglobin 14.3 11.7 - 15.7 g/dL    Hematocrit 42.7 35.0 - 47.0 %    MCV 86 78 - 100 fl    MCH 28.9 26.5 - 33.0 pg    MCHC 33.5 31.5 - 36.5 g/dL    RDW 13.5 10.0 - 15.0 %    Platelet Count 243 150 - 450 10e9/L   Basic metabolic panel  (Ca, Cl, CO2, Creat, Gluc, K, Na, BUN)   Result Value Ref Range    Sodium 139 133 - 144 mmol/L    Potassium 4.4 3.4 - 5.3 mmol/L    Chloride 106 94 - 109 mmol/L    Carbon Dioxide 26 20 - 32 mmol/L    Anion Gap 7 3 - 14 mmol/L    Glucose 85 70 - 99 mg/dL    Urea Nitrogen 16 7 - 30 mg/dL    Creatinine 0.93 0.52 - 1.04 mg/dL    GFR Estimate 63 >60 mL/min/1.7m2    GFR Estimate If Black 76 >60 mL/min/1.7m2    Calcium 9.2 8.5 - 10.1 mg/dL

## 2017-07-04 NOTE — PROGRESS NOTES
The following letter pertains to your most recent diagnostic tests:    -TSH (thyroid stimulating hormone) level is normal which indicates normal circulating thyroid hormone levels.      -Kidney function is normal for you (Creatinine, GFR), Sodium is normal for you, Potassium is normal for you, Calcium is normal for you, Glucose (blood sugar) is normal for you.     -Your complete blood counts including your hemoglobin returned normal for you.         Bottom line:  Your repeat thyroid test is normal. Your other lab results look OK for surgery.            Sincerely,    Dr. Fong

## 2017-07-06 NOTE — H&P (VIEW-ONLY)
"[facility]  [address] [facility] [address]  [phone]  Dept: [phone]    PRE-OP EVALUATION:  Today's date: 7/3/2017    Lilly Diaz (: 1965) presents for pre-operative evaluation assessment as requested by Dr. Dwyer, Keesha Contreras and Lorelei Lundy, .  She requires evaluation and anesthesia risk assessment prior to undergoing surgery/procedure for prevention purposes.  Proposed procedure: LAPAROSCOPIC SALPINGO-OOPHORECTOMY    Date of Surgery/ Procedure: 2017  Time of Surgery/ Procedure: 9:00 AM  Hospital/Surgical Facility:  OR  Fax number for surgical facility:   Primary Physician: Denny Fong  Type of Anesthesia Anticipated: General    Patient has a Health Care Directive or Living Will:  NO    Preop Questions 2017   1.  Do you have a history of heart attack, stroke, stent, bypass or surgery on an artery in the head, neck, heart or legs? No   2.  Do you ever have any pain or discomfort in your chest? YES - rare intermittent episodes of SVT; recent evaluated by EP; using diltiazem \"pill in pocket\" strategy to manage symptoms    3.  Do you have a history of  Heart Failure? No   4.   Are you troubled by shortness of breath when:  walking on a level surface, or up a slight hill, or at night? No   5.  Do you currently have a cold, bronchitis or other respiratory infection? No   6.  Do you have a cough, shortness of breath, or wheezing? No   7.  Do you sometimes get pains in the calves of your legs when you walk? No   8. Do you or anyone in your family have previous history of blood clots? No   9.  Do you or does anyone in your family have a serious bleeding problem such as prolonged bleeding following surgeries or cuts? No   10. Have you ever had problems with anemia or been told to take iron pills? No   11. Have you had any abnormal blood loss such as black, tarry or bloody stools, or abnormal vaginal bleeding? No   12. Have you ever had a blood transfusion? " No   13. Have you or any of your relatives ever had problems with anesthesia? No   14. Do you have sleep apnea, excessive snoring or daytime drowsiness? No   15. Do you have any prosthetic heart valves? No   16. Do you have prosthetic joints? No   17. Is there any chance that you may be pregnant? No         HPI:                                                      Brief HPI related to upcoming procedure: Family history of ovarian cancer.  Patient seeking prophylactic oophorectomy         MEDICAL HISTORY:                                                      Patient Active Problem List    Diagnosis Date Noted     Symptomatic menopausal or female climacteric states 07/03/2017     Priority: Medium     Paroxysmal supraventricular tachycardia (H) 04/27/2017     Priority: Medium     Osteopenia 09/01/2016     Priority: Medium     Hypercalcemia 09/01/2016     Priority: Medium      Past Medical History:   Diagnosis Date     Menopausal hot flushes     she takes prozac for that     Rosacea     she takes doxycycline for that     Past Surgical History:   Procedure Laterality Date     wisdom tooth removal       Current Outpatient Prescriptions   Medication Sig Dispense Refill     Omega-3 Fatty Acids (FISH OIL) 1200 MG CPDR Take 1,200 mg by mouth       magnesium 500 MG TABS Take 1 tablet by mouth daily        Alpha-Lipoic Acid 200 MG CAPS        diltiazem (CARDIZEM) 60 MG tablet Take 1 tab as needed for SVT 5 tablet 0     acetaZOLAMIDE (DIAMOX) 125 MG tablet To prevent altitude illness: Take one to two tab every 12 hours starting 24 hours prior to ascent and continue for 48 hours at highest. 20 tablet 0     doxycycline, Rosacea, (ORACEA) 40 MG CPDR Take 40 mg by mouth daily       FLUOXETINE HCL PO Take 10 mg by mouth daily       Multiple Vitamins-Minerals (MULTIVITAMIN ADULT PO) Take 1 tablet by mouth daily        Cyanocobalamin (VITAMIN B-12 PO) Take 1 tablet by mouth daily        Cetirizine HCl (ZYRTEC ALLERGY PO) Take 10 mg by  "mouth daily        OTC products: None, except as noted above    Allergies   Allergen Reactions     Amoxicillin       Latex Allergy: NO    Social History   Substance Use Topics     Smoking status: Never Smoker     Smokeless tobacco: Never Used     Alcohol use 0.0 oz/week     0 Standard drinks or equivalent per week      Comment: once a week     History   Drug Use No       REVIEW OF SYSTEMS:                                                    Constitutional, neuro, ENT, endocrine, pulmonary, cardiac, gastrointestinal, genitourinary, musculoskeletal, integument and psychiatric systems are negative, except as otherwise noted.    EXAM:                                                    /73 (BP Location: Left arm, Cuff Size: Adult Regular)  Pulse 86  Temp 97.9  F (36.6  C) (Oral)  Ht 5' 5\" (1.651 m)  Wt 177 lb (80.3 kg)  SpO2 100%  BMI 29.45 kg/m2    GENERAL APPEARANCE: healthy, alert and no distress     EYES: EOMI, PERRL     HENT: ear canals and TM's normal and nose and mouth without ulcers or lesions     NECK: no adenopathy, no asymmetry, masses, or scars and thyroid normal to palpation     RESP: lungs clear to auscultation - no rales, rhonchi or wheezes     CV: regular rates and rhythm, normal S1 S2, no S3 or S4 and no murmur, click or rub     ABDOMEN:  soft, nontender, no HSM or masses and bowel sounds normal     MS: extremities normal- no gross deformities noted, no evidence of inflammation in joints, FROM in all extremities.     SKIN: no suspicious lesions or rashes     NEURO: Normal strength and tone, sensory exam grossly normal, mentation intact and speech normal     PSYCH: mentation appears normal. and affect normal/bright     LYMPHATICS: No axillary, cervical, or supraclavicular nodes    DIAGNOSTICS:                                                    EK2017 Sinus rhythm not ST segment changes    Recent Labs   Lab Test  16   1825   NA  139   POTASSIUM  4.1   CR  0.86        IMPRESSION:       "                                              Reason for surgery/procedure: Family history of ovarian cancer ; prophylactic oophorectomy   Diagnosis/reason for consult: Preoperative clearance     The proposed surgical procedure is considered LOW risk.    REVISED CARDIAC RISK INDEX  The patient has the following serious cardiovascular risks for perioperative complications such as (MI, PE, VFib and 3  AV Block):  No serious cardiac risks  INTERPRETATION: 0 risks: Class I (very low risk - 0.4% complication rate)    The patient has the following additional risks for perioperative complications:  No identified additional risks      ICD-10-CM    1. Preop general physical exam Z01.818    2. Paroxysmal supraventricular tachycardia (H) I47.1    3. Symptomatic menopausal or female climacteric states N95.1    4. Hypothyroidism, unspecified type E03.9 TSH with free T4 reflex     SVT symptoms stable; she will consider ablation in the future if needed, but for now the disease is well managed with diltiazem as needed    She has been taking prozac for hot flushes, but we discussed a tapering strategy to get off that drug, she does not like the weight gain side effects    Her TSH was high at  [facility] in April, recheck today     RECOMMENDATIONS:                                                        --Patient is to take all scheduled medications on the day of surgery with a sip of water    APPROVAL GIVEN to proceed with proposed procedure, without further diagnostic evaluation       Signed Electronically by: Denny Fong MD    Copy of this evaluation report is provided to requesting physician.    [place] Preop Guidelines

## 2017-07-11 ENCOUNTER — ANESTHESIA EVENT (OUTPATIENT)
Dept: SURGERY | Facility: CLINIC | Age: 52
End: 2017-07-11
Payer: COMMERCIAL

## 2017-07-11 ENCOUNTER — HOSPITAL ENCOUNTER (OUTPATIENT)
Facility: CLINIC | Age: 52
Discharge: HOME | End: 2017-07-11
Attending: OBSTETRICS & GYNECOLOGY | Admitting: OBSTETRICS & GYNECOLOGY
Payer: COMMERCIAL

## 2017-07-11 ENCOUNTER — ANESTHESIA (OUTPATIENT)
Dept: SURGERY | Facility: CLINIC | Age: 52
End: 2017-07-11
Payer: COMMERCIAL

## 2017-07-11 VITALS
OXYGEN SATURATION: 94 % | RESPIRATION RATE: 16 BRPM | TEMPERATURE: 97.4 F | DIASTOLIC BLOOD PRESSURE: 85 MMHG | SYSTOLIC BLOOD PRESSURE: 108 MMHG | WEIGHT: 174 LBS | HEIGHT: 66 IN | BODY MASS INDEX: 27.97 KG/M2

## 2017-07-11 DIAGNOSIS — G89.18 POST-OP PAIN: Primary | ICD-10-CM

## 2017-07-11 PROCEDURE — 25000125 ZZHC RX 250: Performed by: NURSE ANESTHETIST, CERTIFIED REGISTERED

## 2017-07-11 PROCEDURE — 88112 CYTOPATH CELL ENHANCE TECH: CPT | Mod: 26 | Performed by: OBSTETRICS & GYNECOLOGY

## 2017-07-11 PROCEDURE — 71000027 ZZH RECOVERY PHASE 2 EACH 15 MINS: Performed by: OBSTETRICS & GYNECOLOGY

## 2017-07-11 PROCEDURE — 25000128 H RX IP 250 OP 636: Performed by: NURSE ANESTHETIST, CERTIFIED REGISTERED

## 2017-07-11 PROCEDURE — 25000132 ZZH RX MED GY IP 250 OP 250 PS 637: Performed by: ANESTHESIOLOGY

## 2017-07-11 PROCEDURE — 37000009 ZZH ANESTHESIA TECHNICAL FEE, EACH ADDTL 15 MIN: Performed by: OBSTETRICS & GYNECOLOGY

## 2017-07-11 PROCEDURE — 36000058 ZZH SURGERY LEVEL 3 EA 15 ADDTL MIN: Performed by: OBSTETRICS & GYNECOLOGY

## 2017-07-11 PROCEDURE — 71000012 ZZH RECOVERY PHASE 1 LEVEL 1 FIRST HR: Performed by: OBSTETRICS & GYNECOLOGY

## 2017-07-11 PROCEDURE — 27210794 ZZH OR GENERAL SUPPLY STERILE: Performed by: OBSTETRICS & GYNECOLOGY

## 2017-07-11 PROCEDURE — 25000132 ZZH RX MED GY IP 250 OP 250 PS 637: Performed by: OBSTETRICS & GYNECOLOGY

## 2017-07-11 PROCEDURE — 36000056 ZZH SURGERY LEVEL 3 1ST 30 MIN: Performed by: OBSTETRICS & GYNECOLOGY

## 2017-07-11 PROCEDURE — 25000128 H RX IP 250 OP 636: Performed by: ANESTHESIOLOGY

## 2017-07-11 PROCEDURE — 88305 TISSUE EXAM BY PATHOLOGIST: CPT | Performed by: OBSTETRICS & GYNECOLOGY

## 2017-07-11 PROCEDURE — 40000170 ZZH STATISTIC PRE-PROCEDURE ASSESSMENT II: Performed by: OBSTETRICS & GYNECOLOGY

## 2017-07-11 PROCEDURE — 25000125 ZZHC RX 250: Performed by: OBSTETRICS & GYNECOLOGY

## 2017-07-11 PROCEDURE — 00000155 ZZHCL STATISTIC H-CELL BLOCK W/STAIN: Performed by: OBSTETRICS & GYNECOLOGY

## 2017-07-11 PROCEDURE — 25000566 ZZH SEVOFLURANE, EA 15 MIN: Performed by: OBSTETRICS & GYNECOLOGY

## 2017-07-11 PROCEDURE — 88305 TISSUE EXAM BY PATHOLOGIST: CPT | Mod: 26 | Performed by: OBSTETRICS & GYNECOLOGY

## 2017-07-11 PROCEDURE — 37000008 ZZH ANESTHESIA TECHNICAL FEE, 1ST 30 MIN: Performed by: OBSTETRICS & GYNECOLOGY

## 2017-07-11 PROCEDURE — 71000013 ZZH RECOVERY PHASE 1 LEVEL 1 EA ADDTL HR: Performed by: OBSTETRICS & GYNECOLOGY

## 2017-07-11 PROCEDURE — 88112 CYTOPATH CELL ENHANCE TECH: CPT | Performed by: OBSTETRICS & GYNECOLOGY

## 2017-07-11 RX ORDER — NEOSTIGMINE METHYLSULFATE 1 MG/ML
VIAL (ML) INJECTION PRN
Status: DISCONTINUED | OUTPATIENT
Start: 2017-07-11 | End: 2017-07-11

## 2017-07-11 RX ORDER — FENTANYL CITRATE 0.05 MG/ML
25-50 INJECTION, SOLUTION INTRAMUSCULAR; INTRAVENOUS
Status: DISCONTINUED | OUTPATIENT
Start: 2017-07-11 | End: 2017-07-11 | Stop reason: HOSPADM

## 2017-07-11 RX ORDER — HYDROCODONE BITARTRATE AND ACETAMINOPHEN 5; 325 MG/1; MG/1
1-2 TABLET ORAL
Status: COMPLETED | OUTPATIENT
Start: 2017-07-11 | End: 2017-07-11

## 2017-07-11 RX ORDER — ONDANSETRON 2 MG/ML
4 INJECTION INTRAMUSCULAR; INTRAVENOUS EVERY 30 MIN PRN
Status: DISCONTINUED | OUTPATIENT
Start: 2017-07-11 | End: 2017-07-11 | Stop reason: HOSPADM

## 2017-07-11 RX ORDER — HYDROCODONE BITARTRATE AND ACETAMINOPHEN 5; 325 MG/1; MG/1
1-2 TABLET ORAL EVERY 4 HOURS PRN
Qty: 20 TABLET | Refills: 0 | Status: SHIPPED | OUTPATIENT
Start: 2017-07-11 | End: 2018-02-15

## 2017-07-11 RX ORDER — FENTANYL CITRATE 50 UG/ML
INJECTION, SOLUTION INTRAMUSCULAR; INTRAVENOUS PRN
Status: DISCONTINUED | OUTPATIENT
Start: 2017-07-11 | End: 2017-07-11

## 2017-07-11 RX ORDER — GABAPENTIN 300 MG/1
300 CAPSULE ORAL ONCE
Status: COMPLETED | OUTPATIENT
Start: 2017-07-11 | End: 2017-07-11

## 2017-07-11 RX ORDER — SODIUM CHLORIDE, SODIUM LACTATE, POTASSIUM CHLORIDE, CALCIUM CHLORIDE 600; 310; 30; 20 MG/100ML; MG/100ML; MG/100ML; MG/100ML
INJECTION, SOLUTION INTRAVENOUS CONTINUOUS PRN
Status: DISCONTINUED | OUTPATIENT
Start: 2017-07-11 | End: 2017-07-11

## 2017-07-11 RX ORDER — MEPERIDINE HYDROCHLORIDE 25 MG/ML
12.5 INJECTION INTRAMUSCULAR; INTRAVENOUS; SUBCUTANEOUS
Status: DISCONTINUED | OUTPATIENT
Start: 2017-07-11 | End: 2017-07-11 | Stop reason: HOSPADM

## 2017-07-11 RX ORDER — ACETAMINOPHEN 325 MG/1
975 TABLET ORAL ONCE
Status: COMPLETED | OUTPATIENT
Start: 2017-07-11 | End: 2017-07-11

## 2017-07-11 RX ORDER — FENTANYL CITRATE 50 UG/ML
50-100 INJECTION, SOLUTION INTRAMUSCULAR; INTRAVENOUS
Status: DISCONTINUED | OUTPATIENT
Start: 2017-07-11 | End: 2017-07-11 | Stop reason: HOSPADM

## 2017-07-11 RX ORDER — LIDOCAINE HYDROCHLORIDE 20 MG/ML
INJECTION, SOLUTION INFILTRATION; PERINEURAL PRN
Status: DISCONTINUED | OUTPATIENT
Start: 2017-07-11 | End: 2017-07-11

## 2017-07-11 RX ORDER — PROPOFOL 10 MG/ML
INJECTION, EMULSION INTRAVENOUS PRN
Status: DISCONTINUED | OUTPATIENT
Start: 2017-07-11 | End: 2017-07-11

## 2017-07-11 RX ORDER — NALOXONE HYDROCHLORIDE 0.4 MG/ML
.1-.4 INJECTION, SOLUTION INTRAMUSCULAR; INTRAVENOUS; SUBCUTANEOUS
Status: DISCONTINUED | OUTPATIENT
Start: 2017-07-11 | End: 2017-07-11 | Stop reason: HOSPADM

## 2017-07-11 RX ORDER — BUPIVACAINE HYDROCHLORIDE AND EPINEPHRINE 2.5; 5 MG/ML; UG/ML
INJECTION, SOLUTION INFILTRATION; PERINEURAL PRN
Status: DISCONTINUED | OUTPATIENT
Start: 2017-07-11 | End: 2017-07-11 | Stop reason: HOSPADM

## 2017-07-11 RX ORDER — ONDANSETRON 4 MG/1
4 TABLET, ORALLY DISINTEGRATING ORAL EVERY 30 MIN PRN
Status: DISCONTINUED | OUTPATIENT
Start: 2017-07-11 | End: 2017-07-11 | Stop reason: HOSPADM

## 2017-07-11 RX ORDER — DEXAMETHASONE SODIUM PHOSPHATE 4 MG/ML
INJECTION, SOLUTION INTRA-ARTICULAR; INTRALESIONAL; INTRAMUSCULAR; INTRAVENOUS; SOFT TISSUE PRN
Status: DISCONTINUED | OUTPATIENT
Start: 2017-07-11 | End: 2017-07-11

## 2017-07-11 RX ORDER — ONDANSETRON 2 MG/ML
INJECTION INTRAMUSCULAR; INTRAVENOUS PRN
Status: DISCONTINUED | OUTPATIENT
Start: 2017-07-11 | End: 2017-07-11

## 2017-07-11 RX ORDER — GLYCOPYRROLATE 0.2 MG/ML
INJECTION, SOLUTION INTRAMUSCULAR; INTRAVENOUS PRN
Status: DISCONTINUED | OUTPATIENT
Start: 2017-07-11 | End: 2017-07-11

## 2017-07-11 RX ORDER — SODIUM CHLORIDE, SODIUM LACTATE, POTASSIUM CHLORIDE, CALCIUM CHLORIDE 600; 310; 30; 20 MG/100ML; MG/100ML; MG/100ML; MG/100ML
INJECTION, SOLUTION INTRAVENOUS CONTINUOUS
Status: DISCONTINUED | OUTPATIENT
Start: 2017-07-11 | End: 2017-07-11 | Stop reason: HOSPADM

## 2017-07-11 RX ADMIN — HYDROMORPHONE HYDROCHLORIDE 0.5 MG: 1 INJECTION, SOLUTION INTRAMUSCULAR; INTRAVENOUS; SUBCUTANEOUS at 10:36

## 2017-07-11 RX ADMIN — HYDROMORPHONE HYDROCHLORIDE 0.5 MG: 1 INJECTION, SOLUTION INTRAMUSCULAR; INTRAVENOUS; SUBCUTANEOUS at 10:00

## 2017-07-11 RX ADMIN — HYDROMORPHONE HYDROCHLORIDE 0.5 MG: 1 INJECTION, SOLUTION INTRAMUSCULAR; INTRAVENOUS; SUBCUTANEOUS at 09:28

## 2017-07-11 RX ADMIN — PROPOFOL 200 MG: 10 INJECTION, EMULSION INTRAVENOUS at 09:06

## 2017-07-11 RX ADMIN — HYDROMORPHONE HYDROCHLORIDE 0.5 MG: 1 INJECTION, SOLUTION INTRAMUSCULAR; INTRAVENOUS; SUBCUTANEOUS at 11:07

## 2017-07-11 RX ADMIN — FENTANYL CITRATE 100 MCG: 50 INJECTION, SOLUTION INTRAMUSCULAR; INTRAVENOUS at 09:06

## 2017-07-11 RX ADMIN — DEXAMETHASONE SODIUM PHOSPHATE 4 MG: 4 INJECTION, SOLUTION INTRA-ARTICULAR; INTRALESIONAL; INTRAMUSCULAR; INTRAVENOUS; SOFT TISSUE at 09:20

## 2017-07-11 RX ADMIN — MIDAZOLAM HYDROCHLORIDE 2 MG: 1 INJECTION, SOLUTION INTRAMUSCULAR; INTRAVENOUS at 09:06

## 2017-07-11 RX ADMIN — GLYCOPYRROLATE 0.4 MG: 0.2 INJECTION, SOLUTION INTRAMUSCULAR; INTRAVENOUS at 10:02

## 2017-07-11 RX ADMIN — GABAPENTIN 300 MG: 300 CAPSULE ORAL at 08:43

## 2017-07-11 RX ADMIN — SODIUM CHLORIDE, POTASSIUM CHLORIDE, SODIUM LACTATE AND CALCIUM CHLORIDE: 600; 310; 30; 20 INJECTION, SOLUTION INTRAVENOUS at 09:06

## 2017-07-11 RX ADMIN — HYDROCODONE BITARTRATE AND ACETAMINOPHEN 1 TABLET: 5; 325 TABLET ORAL at 11:14

## 2017-07-11 RX ADMIN — LIDOCAINE HYDROCHLORIDE 100 MG: 20 INJECTION, SOLUTION INFILTRATION; PERINEURAL at 09:06

## 2017-07-11 RX ADMIN — NEOSTIGMINE METHYLSULFATE 3 MG: 1 INJECTION INTRAMUSCULAR; INTRAVENOUS; SUBCUTANEOUS at 10:02

## 2017-07-11 RX ADMIN — SODIUM CHLORIDE, POTASSIUM CHLORIDE, SODIUM LACTATE AND CALCIUM CHLORIDE: 600; 310; 30; 20 INJECTION, SOLUTION INTRAVENOUS at 09:15

## 2017-07-11 RX ADMIN — ONDANSETRON 4 MG: 2 INJECTION INTRAMUSCULAR; INTRAVENOUS at 09:20

## 2017-07-11 RX ADMIN — ACETAMINOPHEN 975 MG: 325 TABLET, FILM COATED ORAL at 08:43

## 2017-07-11 RX ADMIN — ONDANSETRON 4 MG: 2 SOLUTION INTRAMUSCULAR; INTRAVENOUS at 11:20

## 2017-07-11 RX ADMIN — ROCURONIUM BROMIDE 50 MG: 10 INJECTION INTRAVENOUS at 09:06

## 2017-07-11 ASSESSMENT — LIFESTYLE VARIABLES: TOBACCO_USE: 0

## 2017-07-11 ASSESSMENT — COPD QUESTIONNAIRES: COPD: 0

## 2017-07-11 NOTE — IP AVS SNAPSHOT
MRN:[id number]                      After Visit Summary   7/11/2017    Lilly Diaz    MRN: [id number]           Thank you!     Thank you for choosing [facility] for your care. Our goal is always to provide you with excellent care. Hearing back from our patients is one way we can continue to improve our services. Please take a few minutes to complete the written survey that you may receive in the mail after you visit with us. Thank you!        Patient Information     Date Of Birth          1965        About your hospital stay     You were admitted on:  July 11, 2017 You last received care in the:  [facility] PACU    You were discharged on:  July 11, 2017       Who to Call     For medical emergencies, please call 911.  For non-urgent questions about your medical care, please call your primary care provider or clinic, [phone]  For questions related to your surgery, please call your surgery clinic        Attending Provider     Provider Specialty    Keesha Dwyer MD OB/Gyn       Primary Care Provider Office Phone # Fax #    Denny LARRY Fong [phone]      After Care Instructions     Discharge Instructions       Resume pre procedure diet            Discharge Instructions       Pelvic Rest. No tampons, douching or intercourse for  1-2  weeks.            Discharge Instructions       Patient to arrange follow up appointment in 4  weeks            Dressing       Keep incisions clean and dry. May shower post op day 1 and pat incisions dry. Do not rub,wash or pick glue off. Do not cover incisions            No alcohol       NO ALCOHOL for 24 hours post procedure            No driving or operating machinery       No driving or operating machinery until day after procedure            No lifting       No lifting over 15 pounds and no strenuous physical activity.  For 1-2 weeks            Shower        Shower on Post-op day  1.   DO NOT take a bath                   Your next 10 appointments already scheduled     Oct 19, 2017 11:30 AM CDT   Nurse Only with UP NURSE   [place] [facility] ([facility])    [address]  [facility] [address] [phone]              Further instructions from your care team       While you were at the hospital today you were given 1000 mg of Tylenol. The recommended daily maximum dose is 4000 mg. Same Day Surgery Discharge Instructions for  Sedation and General Anesthesia       It's not unusual to feel dizzy, light-headed or faint for up to 24 hours after surgery or while taking pain medication.  If you have these symptoms: sit for a few minutes before standing and have someone assist you when you get up to walk or use the bathroom.      You should rest and relax for the next 24 hours. We recommend you make arrangements to have an adult stay with you for at least 24 hours after your discharge.  Avoid hazardous and strenuous activity.      DO NOT DRIVE any vehicle or operate mechanical equipment for 24 hours following the end of your surgery.  Even though you may feel normal, your reactions may be affected by the medication you have received.      Do not drink alcoholic beverages for 24 hours following surgery.       Slowly progress to your regular diet as you feel able. It's not unusual to feel nauseated and/or vomit after receiving anesthesia.  If you develop these symptoms, drink clear liquids (apple juice, ginger ale, broth, 7-up, etc. ) until you feel better.  If your nausea and vomiting persists for 24 hours, please notify your surgeon.        All narcotic pain medications, along with inactivity and anesthesia, can cause constipation. Drinking plenty of liquids and increasing fiber intake will help.      For any questions of a medical nature, call your surgeon.      Do not make important decisions for 24 hours.      If you had general anesthesia, you may have a sore throat for a couple of days related to the  breathing tube used during surgery.  You may use Cepacol lozenges to help with this discomfort.  If it worsens or if you develop a fever, contact your surgeon.       If you feel your pain is not well managed with the pain medications prescribed by your surgeon, please contact your surgeon's office to let them know so they can address your concerns.     **If you have questions or concerns about your procedure,   Call Dr. Dwyer at [phone]**    HOME CARE FOLLOWING LAPAROSCOPY    Diet  You have no restrictions on your diet.  During the evening following surgery, drink plenty of fluids and eat a light supper.    Nausea  The anesthesia may produce some nausea.  If you feel nauseated, stay in bed and try drinking fluids such as 7-Up, tea, or soup.    Discomfort  The amount of discomfort you can expect is very unpredictable.  If you have pain that cannot be controlled with Tylenol or with the prescription you may have received, you should notify your physician.  The following complaints are not uncommon and should not be cause for concern:   1.  Abdominal tenderness; abdominal cramping   2.  Low backache or pain radiating to your shoulders, chest or back. This is a result of the gas used to inflate your abdomen during surgery. Lying flat in bed seems to help relieve this.   3.  Sore throat for a day or two resulting from the anesthesia tube used during surgery.   4.  Black or blue jeovany on your abdomen.     Drainage  You may expect a small amount of drainage from the incision on your abdomen and you may change the bandage when necessary.  You will also have a small amount of vaginal drainage for several days; this is normal and no cause for concern.  If excessive bleeding occurs, notify your physician.      If dye was used during your procedure, your urine will initially be bright blue. It will gradually return to yellow throughout the day. Drinking plenty of fluids will help to filter the dye from your  "urine.    Fever  A low grade fever (not over 100 F) is usual after this procedure.  Do not hesitate to notify your physician if your fever seems excessive.    Stitches  If your stitches are the type that must be removed, your doctor will instruct you to return to their office.    Activity  Rest on the day of surgery then you may resume your normal activity, as tolerated. Avoid heavy lifting for one week.    You may shower.  Do not douche, and do not use tampons.     Emergency Care  Contact your physician if you have any of these problems:   1.  A fever over 100 F   2.  A large amount of bleeding or drainage   3.  Severe pain            Pending Results     Date and Time Order Name Status Description    7/11/2017 0957 Surgical pathology exam In process     7/11/2017 0937 Cytology non gyn In process             Admission Information     Date & Time Provider Department Dept. Phone    7/11/2017 Keesha Dwyer MD [facility] PACU [phone]      Your Vitals Were     Blood Pressure Temperature Respirations Height Weight       106/55 97.9  F (36.6  C) (Temporal) 15 1.676 m (5' 6\") 78.9 kg (174 lb)     Pulse Oximetry BMI (Body Mass Index)                93% 28.08 kg/m2          [employer] Information     [employer] gives you secure access to your electronic health record. If you see a primary care provider, you can also send messages to your care team and make appointments. If you have questions, please call your primary care clinic.  If you do not have a primary care provider, please call [phone] and they will assist you.        Care EveryWhere ID     This is your Care EveryWhere ID. This could be used by other organizations to access your [facility] medical records  [id number]        Equal Access to Services     MIL GAVIN : oneida Dinero, joel garibay. So [facility] [phone].    ATENCIÓN: Si habla español, tiene a castelan " disposición servicios gratuitos de asistencia lingüística. Leopoldo kendall [phone].    We comply with applicable federal civil rights laws and [place] laws. We do not discriminate on the basis of race, color, national origin, age, disability sex, sexual orientation or gender identity.               Review of your medicines      START taking        Dose / Directions    HYDROcodone-acetaminophen 5-325 MG per tablet   Commonly known as:  NORCO   Used for:  Post-op pain   Notes to Patient:  One given at 11:15.        Dose:  1-2 tablet   Take 1-2 tablets by mouth every 4 hours as needed for other (Moderate to Severe Pain)   Quantity:  20 tablet   Refills:  0         CONTINUE these medicines which have NOT CHANGED        Dose / Directions    Alpha-Lipoic Acid 200 MG Caps        Refills:  0       diltiazem 60 MG tablet   Commonly known as:  CARDIZEM   Used for:  Paroxysmal supraventricular tachycardia (H)        Take 1 tab as needed for SVT   Quantity:  5 tablet   Refills:  0       doxycycline (Rosacea) 40 MG Cpdr CR capsule   Commonly known as:  ORACEA        Dose:  40 mg   Take 40 mg by mouth daily   Refills:  0       Fish Oil 1200 MG Cpdr        Dose:  1200 mg   Take 1,200 mg by mouth   Refills:  0       FLUOXETINE HCL PO        Dose:  10 mg   Take 10 mg by mouth daily   Refills:  0       magnesium 500 MG Tabs        Dose:  1 tablet   Take 1 tablet by mouth daily   Refills:  0       MULTIVITAMIN ADULT PO        Dose:  1 tablet   Take 1 tablet by mouth daily   Refills:  0       VITAMIN B-12 PO        Dose:  1000 mcg   Take 1,000 mcg by mouth daily   Refills:  0       ZYRTEC ALLERGY PO        Dose:  10 mg   Take 10 mg by mouth daily   Refills:  0         STOP taking     acetaZOLAMIDE 125 MG tablet   Commonly known as:  DIAMOX                Where to get your medicines      Some of these will need a paper prescription and others can be bought over the counter. Ask your nurse if you have questions.     Bring a paper  prescription for each of these medications     HYDROcodone-acetaminophen 5-325 MG per tablet                Protect others around you: Learn how to safely use, store and throw away your medicines at www.disposemymeds.org.             Medication List: This is a list of all your medications and when to take them. Check marks below indicate your daily home schedule. Keep this list as a reference.      Medications           Morning Afternoon Evening Bedtime As Needed    Alpha-Lipoic Acid 200 MG Caps                                diltiazem 60 MG tablet   Commonly known as:  CARDIZEM   Take 1 tab as needed for SVT                                doxycycline (Rosacea) 40 MG Cpdr CR capsule   Commonly known as:  ORACEA   Take 40 mg by mouth daily                                Fish Oil 1200 MG Cpdr   Take 1,200 mg by mouth                                FLUOXETINE HCL PO   Take 10 mg by mouth daily                                HYDROcodone-acetaminophen 5-325 MG per tablet   Commonly known as:  NORCO   Take 1-2 tablets by mouth every 4 hours as needed for other (Moderate to Severe Pain)   Last time this was given:  1 tablet on 7/11/2017 11:14 AM   Notes to Patient:  One given at 11:15.                                magnesium 500 MG Tabs   Take 1 tablet by mouth daily                                MULTIVITAMIN ADULT PO   Take 1 tablet by mouth daily                                VITAMIN B-12 PO   Take 1,000 mcg by mouth daily                                ZYRTEC ALLERGY PO   Take 10 mg by mouth daily

## 2017-07-11 NOTE — ANESTHESIA POSTPROCEDURE EVALUATION
Patient: Lilly Diaz    Procedure(s):  LAPAROSCOPIC BILATERAL SALPING-OOPHORECTOMY - Wound Class: II-Clean Contaminated    Diagnosis:FAMILY HISTORY OF OVARIAN CANCER  Diagnosis Additional Information: No value filed.    Anesthesia Type:  General, ETT    Note:  Anesthesia Post Evaluation    Patient location during evaluation: PACU  Patient participation: Able to fully participate in evaluation  Level of consciousness: awake and alert  Pain management: adequate  Airway patency: patent  Cardiovascular status: acceptable  Respiratory status: acceptable  Hydration status: acceptable  PONV: none     Anesthetic complications: None          Last vitals:  Vitals:    07/11/17 1130 07/11/17 1145 07/11/17 1200   BP: 108/85     Resp: 16 14 16   Temp:   36.3  C (97.4  F)   SpO2: 95%  94%         Electronically Signed By: Niranjan Zhang MD  July 11, 2017  12:54 PM

## 2017-07-11 NOTE — BRIEF OP NOTE
[facility] Brief Operative Note    Pre-operative diagnosis: FAMILY HISTORY OF OVARIAN CANCER   Post-operative diagnosis same   Procedure: Procedure(s):  LAPAROSCOPIC BILATERAL SALPINGO-OOPHORECTOMY, pelvic washings for cytology - Wound Class: II-Clean Contaminated   Surgeon(s): Surgeon(s) and Role:     * Keesha Dwyer MD - Primary     * Lorelei Lundy MD - Assisting   Estimated blood loss: 2 mL    Specimens:   ID Type Source Tests Collected by Time Destination   A : Pelvic washings Washings Pelvis CYTOLOGY NON GYN Keesha Dwyer MD 7/11/2017  9:37 AM    B : Bilateral fallopian tubes and ovaries Tissue Fallopian Tube and Ovary, Bilateral SURGICAL PATHOLOGY EXAM Keesha Dwyer MD 7/11/2017  9:56 AM       Findings: Normal tubes and ovaries. Normal uterus. Pelvic peritoneum normal Normal appendix and liver edge. No abdominal adhesions

## 2017-07-11 NOTE — ANESTHESIA PREPROCEDURE EVALUATION
Anesthesia Evaluation     . Pt has had prior anesthetic. Type: MAC    No history of anesthetic complications          ROS/MED HX    ENT/Pulmonary:      (-) tobacco use, asthma, COPD and sleep apnea   Neurologic:       Cardiovascular: Comment: Intermittent SVT    (+) ----. : . . . :. Irregular Heartbeat/Palpitations, .      (-) hypertension and CAD   METS/Exercise Tolerance:     Hematologic:         Musculoskeletal:         GI/Hepatic:        (-) GERD and liver disease   Renal/Genitourinary:      (-) renal disease   Endo:     (+) thyroid problem hypothyroidism, .   (-) Type I DM and Type II DM   Psychiatric:         Infectious Disease:         Malignancy:         Other:                     Physical Exam  Normal systems: cardiovascular, pulmonary and dental    Airway   Mallampati: II  TM distance: >3 FB  Neck ROM: full    Dental     Cardiovascular       Pulmonary                     Anesthesia Plan      History & Physical Review  History and physical reviewed and following examination; no interval change.    ASA Status:  2 .    NPO Status:  > 8 hours    Plan for General and ETT with Intravenous induction. Maintenance will be Balanced.    PONV prophylaxis:  Ondansetron (or other 5HT-3) and Dexamethasone or Solumedrol       Postoperative Care  Postoperative pain management:  Multi-modal analgesia.      Consents  Anesthetic plan, risks, benefits and alternatives discussed with:  Patient..                          .

## 2017-07-11 NOTE — OR NURSING
"Continues to feel \"dizzy.\" Will wait few minutes to get patient dressed. Nausea improved after Zofran.  "

## 2017-07-11 NOTE — DISCHARGE INSTRUCTIONS
While you were at the hospital today you were given 1000 mg of Tylenol. The recommended daily maximum dose is 4000 mg. Same Day Surgery Discharge Instructions for  Sedation and General Anesthesia       It's not unusual to feel dizzy, light-headed or faint for up to 24 hours after surgery or while taking pain medication.  If you have these symptoms: sit for a few minutes before standing and have someone assist you when you get up to walk or use the bathroom.      You should rest and relax for the next 24 hours. We recommend you make arrangements to have an adult stay with you for at least 24 hours after your discharge.  Avoid hazardous and strenuous activity.      DO NOT DRIVE any vehicle or operate mechanical equipment for 24 hours following the end of your surgery.  Even though you may feel normal, your reactions may be affected by the medication you have received.      Do not drink alcoholic beverages for 24 hours following surgery.       Slowly progress to your regular diet as you feel able. It's not unusual to feel nauseated and/or vomit after receiving anesthesia.  If you develop these symptoms, drink clear liquids (apple juice, ginger ale, broth, 7-up, etc. ) until you feel better.  If your nausea and vomiting persists for 24 hours, please notify your surgeon.        All narcotic pain medications, along with inactivity and anesthesia, can cause constipation. Drinking plenty of liquids and increasing fiber intake will help.      For any questions of a medical nature, call your surgeon.      Do not make important decisions for 24 hours.      If you had general anesthesia, you may have a sore throat for a couple of days related to the breathing tube used during surgery.  You may use Cepacol lozenges to help with this discomfort.  If it worsens or if you develop a fever, contact your surgeon.       If you feel your pain is not well managed with the pain medications prescribed by your surgeon, please contact your  surgeon's office to let them know so they can address your concerns.     **If you have questions or concerns about your procedure,   Call Dr. Dwyer at [phone]**    HOME CARE FOLLOWING LAPAROSCOPY    Diet  You have no restrictions on your diet.  During the evening following surgery, drink plenty of fluids and eat a light supper.    Nausea  The anesthesia may produce some nausea.  If you feel nauseated, stay in bed and try drinking fluids such as 7-Up, tea, or soup.    Discomfort  The amount of discomfort you can expect is very unpredictable.  If you have pain that cannot be controlled with Tylenol or with the prescription you may have received, you should notify your physician.  The following complaints are not uncommon and should not be cause for concern:   1.  Abdominal tenderness; abdominal cramping   2.  Low backache or pain radiating to your shoulders, chest or back. This is a result of the gas used to inflate your abdomen during surgery. Lying flat in bed seems to help relieve this.   3.  Sore throat for a day or two resulting from the anesthesia tube used during surgery.   4.  Black or blue jeovany on your abdomen.     Drainage  You may expect a small amount of drainage from the incision on your abdomen and you may change the bandage when necessary.  You will also have a small amount of vaginal drainage for several days; this is normal and no cause for concern.  If excessive bleeding occurs, notify your physician.      If dye was used during your procedure, your urine will initially be bright blue. It will gradually return to yellow throughout the day. Drinking plenty of fluids will help to filter the dye from your urine.    Fever  A low grade fever (not over 100 F) is usual after this procedure.  Do not hesitate to notify your physician if your fever seems excessive.    Stitches  If your stitches are the type that must be removed, your doctor will instruct you to return to their  office.    Activity  Rest on the day of surgery then you may resume your normal activity, as tolerated. Avoid heavy lifting for one week.    You may shower.  Do not douche, and do not use tampons.     Emergency Care  Contact your physician if you have any of these problems:   1.  A fever over 100 F   2.  A large amount of bleeding or drainage   3.  Severe pain

## 2017-07-11 NOTE — ANESTHESIA CARE TRANSFER NOTE
Patient: Lilly Diaz    Procedure(s):  LAPAROSCOPIC BILATERAL SALPING-OOPHORECTOMY - Wound Class: II-Clean Contaminated    Diagnosis: FAMILY HISTORY OF OVARIAN CANCER  Diagnosis Additional Information: No value filed.    Anesthesia Type:   General, ETT     Note:  Airway :Face Mask  Patient transferred to:PACU        Vitals: (Last set prior to Anesthesia Care Transfer)    CRNA VITALS  7/11/2017 0942 - 7/11/2017 1020      7/11/2017             NIBP: 144/84    NIBP Mean: 107                Electronically Signed By: GRAYSON Mautte CRNA  July 11, 2017  10:20 AM

## 2017-07-11 NOTE — IP AVS SNAPSHOT
[facility] [address]    [place] [address]    Phone:  [phone]                                       After Visit Summary   7/11/2017    Lilly Diaz    MRN: [id number]           After Visit Summary Signature Page     I have received my discharge instructions, and my questions have been answered. I have discussed any challenges I see with this plan with the nurse or doctor.    ..........................................................................................................................................  Patient/Patient Representative Signature      ..........................................................................................................................................  Patient Representative Print Name and Relationship to Patient    ..................................................               ................................................  Date                                            Time    ..........................................................................................................................................  Reviewed by Signature/Title    ...................................................              ..............................................  Date                                                            Time

## 2017-07-12 LAB
COPATH REPORT: NORMAL
COPATH REPORT: NORMAL

## 2017-07-13 NOTE — OP NOTE
Surgeon / Clinician: Keesha Dwyer MD    PREOPERATIVE DIAGNOSIS:  Family history of ovarian cancer.    POSTOPERATIVE DIAGNOSIS:  Family history of ovarian cancer.    PROCEDURE:  Risk reducing laparoscopic bilateral salpingo-oophorectomy, pelvic washings for cytology.    ANESTHESIA:  General.    ESTIMATED BLOOD LOSS:  2 mL.    COMPLICATIONS:  None apparent.    DRAINS:  None.    SURGEON:  Keesha Dwyer MD    ASSISTANT:  Lorelei Lundy MD    PREOPERATIVE STATUS:  Lilly Diaz is a 51-year-old para 2, LMP 09/2014 who is admitted for laparoscopic risk-reducing removal of tubes and ovaries due to a family history of ovarian cancer in her sister and a maternal cousin.  Risks, benefits and alternatives have been discussed at length including risk of anesthesia, injury to bowel, bladder, ureter, bleeding, infection, wound complications and need for additional surgery due to any above complications.  That this did not reduce her risk of ovarian cancer 100% but did not reduce it more than 90% and due to the possibility of primary peritoneal cancer was discussed and she accepts this.  Recovery was discussed.      OPERATIVE FINDINGS:  At laparoscopy the tubes and ovaries both appeared normal.  The uterus was normal.  The pelvic peritoneum was normal.  The appendix and liver edge appeared normal.  There were no abdominal adhesions.      OPERATIVE PROCEDURE:  The patient was taken to the operating room and general anesthesia induced.  She was then placed in the dorsal lithotomy position and exam under anesthesia performed.  She was then prepped and draped in the usual fashion.  Pneumoboots were on.  Yellofin stirrups were used.  After assuring adequate anesthesia, timeout was performed.  She was then prepped and draped.  Speculum was placed to the vagina and cervix visualized.  Tenaculum placed on the anterior lip.  Puneet cannula was placed into the endocervix for mobilization of the uterus and attached to the  tenaculum.  The speculum was removed.  Sy catheter was placed for bladder drainage and 60 mL of clear urine was drained through the case.  Attention was then given to the abdomen.  The infraumbilical area was tented and injected with 0.25% Marcaine with epinephrine.  A 5 mm vertical subumbilical incision was made.  The abdomen was tented and the Veress needle inserted without complication.  Intraperitoneal location was assured by saline drop test and opening pressure.  With an opening pressure of 3-6, 3 liters of CO2 gas was instilled in the peritoneal cavity.  The Veress needle was removed.  The abdomen was tented and a 5 mm trocar was inserted without complication.  Intraperitoneal location was assured by laparoscope and there was no evidence of entering trauma.  The patient was then placed in Trendelenburg.  Right lower quadrant 5 mm incision was made and trocar placed under direct vision.  Marcaine had also been injected at the beginning of that incision.  Left lower quadrant Marcaine was injected and a 12 mm trocar was inserted through that incision under direct vision without complication.  The pelvis was inspected.  Pelvic washings were taken for cytology.  Tubes and ovaries were inspected and both ureters were easily identified well below the infundibulopelvic ligaments.  The left tube and ovary were elevated away from the pelvic sidewall.  Using the Olympus cautery, the infundibulopelvic ligament was cauterized and cut and this was continued along the utero-ovarian ligament and mesosalpinx to the cornu.  The ovary was attached almost all the way under the tube to the cornu.  The tube and ovarian ligament were cauterized across at the cornu.  The specimen was removed and placed in the cul-de-sac.  The pedicles from the infundibulopelvic ligament along the salpinx were cauterized again to assure hemostasis all the way to the cornu.  On the patient's right, the tube and ovary were elevated away from the  sidewall.  Again, infundibulopelvic ligament was cauterized with the Olympus and cut and this continued along the attachment to the ovary, the mesosalpinx beneath the tube to the cornu and across the tube and the ovarian ligament.  Again, the pedicles were recauterized to assure hemostasis.  The Endo Catch bag was inserted through the left lower quadrant port and each ovary removed separately in a separate Endo Catch bag to minimize the diameter of the specimen.  Each of these were brought up to the base opening of the trocar and removed with the trocar.  The trocar was reinserted and a second Endo Catch bag and specimen removed in the same fashion.  Trocar was replaced under direct vision.  The pelvis was then irrigated and all the pedicles were inspected and there was no evidence of any bleeding.  Upper abdomen and appendix were inspected.  The patient was taken out of Trendelenburg and minimal addition of fluid was suctioned.  The left lower quadrant trocar was then removed and the fascia closed with 0 Vicryl through a Victoriano-Fatmata device under direct vision with good closure.  The right lower quadrant trocar was then removed under direct vision and there was no bleeding.  The gas was then released and the umbilical trocar removed.  The skin incisions were closed with Dermabond glue.  The tenaculum and Puneet cannula were then removed and the tenaculum site hemostatic.  Sy catheter was removed.  The patient went to recovery in good condition.  All sponge and needle counts were correct.  Note that Dr. Lundy was present throughout the case and was essential to aid in exposure and retraction for removal of the tubes and ovaries.          Keesha Dwyer MD    D:  07/11/2017 11:30 T:  07/13/2017 12:25  Document:  [id number]    cc: Denny Fong MD

## 2017-10-19 ENCOUNTER — ALLIED HEALTH/NURSE VISIT (OUTPATIENT)
Dept: NURSING | Facility: CLINIC | Age: 52
End: 2017-10-19
Payer: COMMERCIAL

## 2017-10-19 DIAGNOSIS — Z71.84 TRAVEL ADVICE ENCOUNTER: ICD-10-CM

## 2017-10-19 DIAGNOSIS — Z23 NEED FOR VACCINATION: ICD-10-CM

## 2017-10-19 PROCEDURE — 90636 HEP A/HEP B VACC ADULT IM: CPT | Mod: GA

## 2017-10-19 PROCEDURE — 90471 IMMUNIZATION ADMIN: CPT | Mod: GA

## 2017-10-19 NOTE — PROGRESS NOTES
Screening Questionnaire for Adult Immunization    Are you sick today?   No   Do you have allergies to medications, food, a vaccine component or latex?   No   Have you ever had a serious reaction after receiving a vaccination?   No   Do you have a long-term health problem with heart disease, lung disease, asthma, kidney disease, metabolic disease (e.g. diabetes), anemia, or other blood disorder?   No   Do you have cancer, leukemia, HIV/AIDS, or any other immune system problem?   No   In the past 3 months, have you taken medications that affect  your immune system, such as prednisone, other steroids, or anticancer drugs; drugs for the treatment of rheumatoid arthritis, Crohn s disease, or psoriasis; or have you had radiation treatments?   No   Have you had a seizure, or a brain or other nervous system problem?   No   During the past year, have you received a transfusion of blood or blood     products, or been given immune (gamma) globulin or antiviral drug?   No   For women: Are you pregnant or is there a chance you could become        pregnant during the next month?   No   Have you received any vaccinations in the past 4 weeks?   No     Immunization questionnaire answers were all negative.      Prior to injection verified patient identity using patient's name and date of birth.    Per orders of CATHERINE Quispe, injection of Twinrix given by Emily Torres. Patient instructed to remain in clinic for 15 minutes afterwards, and to report any adverse reaction to me immediately.       Screening performed by Emily Torres on 10/19/2017 at 1:22 PM.

## 2017-10-19 NOTE — MR AVS SNAPSHOT
"              After Visit Summary   10/19/2017    Lilly Diaz    MRN: [id number]           Patient Information     Date Of Birth          1965        Visit Information        Provider Department      10/19/2017 11:30 AM UP NURSE [facility] Nurse        Today's Diagnoses     Need for vaccination        Travel advice encounter           Follow-ups after your visit        Your next 10 appointments already scheduled     Dec 26, 2017  9:00 AM CST   MA SCREENING DIGITAL BILATERAL with [id number]   [facility] ([facility])    [address] [place] [address], [address]  [facility] [address] [phone]           Do not use any powder, lotion or deodorant under your arms or on your breast. If you do, we will ask you to remove it before your exam.  Wear comfortable, two-piece clothing.  If you have any allergies, tell your care team.  Bring any previous mammograms from other facilities or have them mailed to the breast center. Three-dimensional (3D) mammograms are available at [facility] locations in [facility], [facility], [facility], and [place]. [facility] locations include [place] and [facility] in [place]. Benefits of 3D mammograms include: - Improved rate of cancer detection - Decreases your chance of having to go back for more tests, which means fewer: - \"False-positive\" results (This means that there is an abnormal area but it isn't cancer.) - Invasive testing procedures, such as a biopsy or surgery - Can provide clearer images of the breast if you have dense breast tissue. 3D mammography is an optional exam that anyone can have with a 2D mammogram. It doesn't replace or take the place of a 2D mammogram. 2D mammograms remain an effective screening test for all women.  Not all insurance companies cover the cost of a 3D mammogram. Check with your insurance.              Who to contact     If you have questions or need " follow up information about today's clinic visit or your schedule please contact [facility] N[facility] directly at [phone].  Normal or non-critical lab and imaging results will be communicated to you by MyChart, letter or phone within 4 business days after the clinic has received the results. If you do not hear from us within 7 days, please contact the clinic through [employer] or phone. If you have a critical or abnormal lab result, we will notify you by phone as soon as possible.  Submit refill requests through [facility] or call your pharmacy and they will forward the refill request to us. Please allow 3 business days for your refill to be completed.          Additional Information About Your Visit        [employer] Information     [facility] gives you secure access to your electronic health record. If you see a primary care provider, you can also send messages to your care team and make appointments. If you have questions, please call your primary care clinic.  If you do not have a primary care provider, please call [phone] and they will assist you.        Care EveryWhere ID     This is your Care EveryWhere ID. This could be used by other organizations to access your [facility] medical records  [id number]         Blood Pressure from Last 3 Encounters:   07/11/17 108/85   07/03/17 116/73   05/25/17 136/82    Weight from Last 3 Encounters:   07/11/17 174 lb (78.9 kg)   07/03/17 177 lb (80.3 kg)   05/25/17 174 lb 12.8 oz (79.3 kg)              We Performed the Following     HEPA/HEPB VACCINE ADULT IM        Primary Care Provider Office Phone # Fax #    Denny Fong [phone]       [facility] [address]  [place] [address]        Equal Access to Services     KIMBERLEY GAVIN : Hadii helene Anaya, waaxda luqadaha, qaybta kaalmada ramonita, joel bauer. So [facility] [phone].    ATENCIÓN: Si habla español, tiene a castelan disposición servicios  suyapa de asistencia lingüística. Leopoldo kendall [phone].    We comply with applicable federal civil rights laws and [place] laws. We do not discriminate on the basis of race, color, national origin, age, disability, sex, sexual orientation, or gender identity.            Thank you!     Thank you for choosing [facility] NURSE  for your care. Our goal is always to provide you with excellent care. Hearing back from our patients is one way we can continue to improve our services. Please take a few minutes to complete the written survey that you may receive in the mail after your visit with us. Thank you!             Your Updated Medication List - Protect others around you: Learn how to safely use, store and throw away your medicines at www.disposemymeds.org.          This list is accurate as of: 10/19/17  1:22 PM.  Always use your most recent med list.                   Brand Name Dispense Instructions for use Diagnosis    Alpha-Lipoic Acid 200 MG Caps           diltiazem 60 MG tablet    CARDIZEM    5 tablet    Take 1 tab as needed for SVT    Paroxysmal supraventricular tachycardia (H)       doxycycline (Rosacea) 40 MG Cpdr CR capsule    ORACEA     Take 40 mg by mouth daily        Fish Oil 1200 MG Cpdr      Take 1,200 mg by mouth        FLUOXETINE HCL PO      Take 10 mg by mouth daily        HYDROcodone-acetaminophen 5-325 MG per tablet    NORCO    20 tablet    Take 1-2 tablets by mouth every 4 hours as needed for other (Moderate to Severe Pain)    Post-op pain       magnesium 500 MG Tabs      Take 1 tablet by mouth daily        MULTIVITAMIN ADULT PO      Take 1 tablet by mouth daily        VITAMIN B-12 PO      Take 1,000 mcg by mouth daily        ZYRTEC ALLERGY PO      Take 10 mg by mouth daily

## 2017-12-22 ENCOUNTER — DOCUMENTATION ONLY (OUTPATIENT)
Dept: OTHER | Facility: CLINIC | Age: 52
End: 2017-12-22

## 2017-12-22 PROBLEM — Z71.89 ACP (ADVANCE CARE PLANNING): Chronic | Status: ACTIVE | Noted: 2017-12-22

## 2017-12-26 ENCOUNTER — HOSPITAL ENCOUNTER (OUTPATIENT)
Dept: MAMMOGRAPHY | Facility: CLINIC | Age: 52
Discharge: HOME | End: 2017-12-26
Attending: OBSTETRICS & GYNECOLOGY | Admitting: OBSTETRICS & GYNECOLOGY
Payer: COMMERCIAL

## 2017-12-26 DIAGNOSIS — Z12.31 VISIT FOR SCREENING MAMMOGRAM: ICD-10-CM

## 2017-12-26 PROCEDURE — G0202 SCR MAMMO BI INCL CAD: HCPCS

## 2018-01-11 ENCOUNTER — DOCUMENTATION ONLY (OUTPATIENT)
Dept: OTHER | Facility: CLINIC | Age: 53
End: 2018-01-11

## 2018-02-12 ENCOUNTER — TELEPHONE (OUTPATIENT)
Dept: CARDIOLOGY | Facility: CLINIC | Age: 53
End: 2018-02-12

## 2018-02-12 NOTE — TELEPHONE ENCOUNTER
LM for pt to call back to discuss upcoming OV on 2/5 with Dr De La Cruz. Per  wants to discuss ablation d/t episodes of SVT. Earnest

## 2018-02-12 NOTE — TELEPHONE ENCOUNTER
Pt states that she had an episode in October and December and felt that it was time to discuss the ablation again.  Pt does state that she is scared of the ablation.  Pt also stated that she feels good when she exercises and that she had no problems when she was in [place].  At times feels like her heart is going to take off, but it does not. Earnest

## 2018-02-15 ENCOUNTER — OFFICE VISIT (OUTPATIENT)
Dept: CARDIOLOGY | Facility: CLINIC | Age: 53
End: 2018-02-15
Attending: INTERNAL MEDICINE
Payer: COMMERCIAL

## 2018-02-15 VITALS — HEART RATE: 72 BPM | DIASTOLIC BLOOD PRESSURE: 76 MMHG | HEIGHT: 65 IN | SYSTOLIC BLOOD PRESSURE: 128 MMHG

## 2018-02-15 DIAGNOSIS — I47.10 PAROXYSMAL SUPRAVENTRICULAR TACHYCARDIA (H): ICD-10-CM

## 2018-02-15 PROCEDURE — 93000 ELECTROCARDIOGRAM COMPLETE: CPT | Performed by: INTERNAL MEDICINE

## 2018-02-15 PROCEDURE — 99215 OFFICE O/P EST HI 40 MIN: CPT | Performed by: INTERNAL MEDICINE

## 2018-02-15 NOTE — PROGRESS NOTES
Service Date: 02/15/2018      HISTORY OF PRESENT ILLNESS:    It was my pleasure seeing Ms. Lilly Diaz in follow-up of SVT.  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  It resolved with Valsalva maneuvers.  I have not yet seen a 12-lead ECG of her arrhythmia.  When I saw Ms. Diaz back in 05/2017, we discussed treatment options.  At the time Lilly decided to pursue no active treatment.  She had an echocardiogram that showed a structurally normal heart.      Since then she has had 2 episodes of SVT that were quite symptomatic.  Both resolved with Valsalva maneuvers.  Because of these recurrences, she requested the visit today to re-discuss treatment options.        She is otherwise feeling well.  She has had no chest pain, unusual dyspnea or other cardiac symptoms.  She did undergo a prophylactic oophorectomy last summer.      PHYSICAL EXAMINATION:   VITAL SIGNS:  Blood pressure 128/76, pulse 72 and regular, weight 165 cm.   CARDIOVASCULAR:  Regular rhythm without murmur or rub.      DIAGNOSTIC STUDIES:  Her 12-lead ECG today showed sinus rhythm with an RSR prime pattern in lead V1.      IMPRESSION:    1.  Paroxysmal supraventricular tachycardia.  This responds to Valsalva, so it is likely a reentrant SVT.  I repeated the discussion we had back in May.  Specifically, we discussed the options of continued observation, pharmacologic therapy and catheter ablation.        The patient stated she does not want to commit to chronic drug therapy.  She is interested in undergoing catheter ablation.  I went over the technical aspects, risks and benefits of the procedure with the patient and her , Wilfredo.  I quoted a greater than 95% likelihood of successful ablation for re-entrant SVT with an approximately 1-1.5% risk of serious complication.  Such complications include but are not limited to injury to the AV conduction system  requiring pacemaker implantation, DVT, bleeding, vascular injury, phrenic nerve injury, TIA, CVA, and other unforeseen complications.        Lolly had several questions and all were answered.  She has agreed to proceed with SVT ablation.      RECOMMENDATIONS:   A.  Schedule SVT ablation.        It was my pleasure seeing this delightful patient. Time spent was 25 minutes, all discussion.  Please feel free to contact me with any questions.         CONSUELO GODINEZ MD, FACC         cc:   [place] [address]          D: 02/15/2018   T: 02/15/2018   MT: LAWRENCE      Name:     LOLLY JAVIER   MRN:      [id number]        Account:      [id number]   :      1965           Service Date: 02/15/2018      Document: [id number]

## 2018-02-15 NOTE — LETTER
2/15/2018    Denny Fong MD  [facility] [address]  [facility] [address]    RE: Lilly Diaz       Dear Colleague,    I had the pleasure of seeing Lilly Diaz in the [facility].    HPI and Plan:   See dictation    Orders Placed This Encounter   Procedures     EKG 12-lead complete w/read - Clinics (performed today)     EP Ablation Procedures       Orders Placed This Encounter   Medications     cholecalciferol (VITAMIN D3) 5000 UNITS CAPS capsule     Sig: Take 5,000 Units by mouth daily       Medications Discontinued During This Encounter   Medication Reason     FLUOXETINE HCL PO Stopped by Patient     HYDROcodone-acetaminophen (NORCO) 5-325 MG per tablet Therapy completed         Encounter Diagnosis   Name Primary?     Paroxysmal supraventricular tachycardia (H)        CURRENT MEDICATIONS:  Current Outpatient Prescriptions   Medication Sig Dispense Refill     cholecalciferol (VITAMIN D3) 5000 UNITS CAPS capsule Take 5,000 Units by mouth daily       Omega-3 Fatty Acids (FISH OIL) 1200 MG CPDR Take 1,200 mg by mouth       magnesium 500 MG TABS Take 1 tablet by mouth daily        Alpha-Lipoic Acid 200 MG CAPS        diltiazem (CARDIZEM) 60 MG tablet Take 1 tab as needed for SVT 5 tablet 0     doxycycline, Rosacea, (ORACEA) 40 MG CPDR Take 40 mg by mouth daily       Multiple Vitamins-Minerals (MULTIVITAMIN ADULT PO) Take 1 tablet by mouth daily        Cyanocobalamin (VITAMIN B-12 PO) Take 1,000 mcg by mouth daily        Cetirizine HCl (ZYRTEC ALLERGY PO) Take 10 mg by mouth daily          ALLERGIES     Allergies   Allergen Reactions     Amoxicillin Rash       PAST MEDICAL HISTORY:  Past Medical History:   Diagnosis Date     Menopausal hot flushes     she takes prozac for that     Rosacea     she takes doxycycline for that     SVT (supraventricular tachycardia) (H)     SVT in April 2017   - vagal manuver effective       PAST SURGICAL HISTORY:  Past  "Surgical History:   Procedure Laterality Date     LAPAROSCOPIC SALPINGO-OOPHORECTOMY Bilateral 2017    Procedure: LAPAROSCOPIC SALPINGO-OOPHORECTOMY;  LAPAROSCOPIC BILATERAL SALPING-OOPHORECTOMY;  Surgeon: Keesha Dwyer MD;  Location: [facility] OR     wisdom tooth removal         FAMILY HISTORY:  Family History   Problem Relation Age of Onset     Type 2 Diabetes Father      Coronary Artery Disease Mother       52     Ovarian Cancer Sister       40       SOCIAL HISTORY:  Social History     Social History     Marital status:      Spouse name: N/A     Number of children: N/A     Years of education: N/A     Social History Main Topics     Smoking status: Never Smoker     Smokeless tobacco: Never Used     Alcohol use 0.0 oz/week     0 Standard drinks or equivalent per week      Comment: once a week     Drug use: No     Sexual activity: Yes     Partners: Male     Birth control/ protection: Post-menopausal     Other Topics Concern     Parent/Sibling W/ Cabg, Mi Or Angioplasty Before 65f 55m? 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:  Skin:  Negative       Eyes:  Negative      ENT:  Negative      Respiratory:  Positive for cough;shortness of breath (SOB w/palpitations ) r/t allergies    Cardiovascular:  Negative for;lightheadedness Positive for;palpitations    Gastroenterology: Positive for   abd \"tightness\" w/palpitations   Genitourinary:  Negative      Musculoskeletal:  Negative      Neurologic:  Negative      Psychiatric:  Positive for sleep disturbances;anxiety    Heme/Lymph/Imm:  Negative      Endocrine:  Positive for hot flashes (Elevated TSH in ED, PMD to recheck in 2017) patient r/t menopause     [id number]              Thank you for allowing me to participate in the care of your patient.      Sincerely,     Vito De La Cruz MD     [facility]    cc:   Harris Powell MD  [address]  [place] [address]

## 2018-02-15 NOTE — MR AVS SNAPSHOT
"              After Visit Summary   2/15/2018    Lilly Diaz    MRN: [id number]           Patient Information     Date Of Birth          1965        Visit Information        Provider Department      2/15/2018 11:15 AM Vito De La Cruz MD [facility]        Today's Diagnoses     Paroxysmal supraventricular tachycardia (H)           Follow-ups after your visit        Who to contact     If you have questions or need follow up information about today's clinic visit or your schedule please contact [facility] directly at [phone].  Normal or non-critical lab and imaging results will be communicated to you by [employer], letter or phone within 4 business days after the clinic has received the results. If you do not hear from us within 7 days, please contact the clinic through [employer] or phone. If you have a critical or abnormal lab result, we will notify you by phone as soon as possible.  Submit refill requests through [employer] or call your pharmacy and they will forward the refill request to us. Please allow 3 business days for your refill to be completed.          Additional Information About Your Visit        MyChart Information     [employer] gives you secure access to your electronic health record. If you see a primary care provider, you can also send messages to your care team and make appointments. If you have questions, please call your primary care clinic.  If you do not have a primary care provider, please call [phone] and they will assist you.        Care EveryWhere ID     This is your Care EveryWhere ID. This could be used by other organizations to access your [facility] medical records  [id number]        Your Vitals Were     Pulse Height                72 1.651 m (5' 5\")           Blood Pressure from Last 3 Encounters:   02/15/18 128/76   07/11/17 108/85   07/03/17 116/73    Weight from Last 3 Encounters: "   07/11/17 78.9 kg (174 lb)   07/03/17 80.3 kg (177 lb)   05/25/17 79.3 kg (174 lb 12.8 oz)              We Performed the Following     EKG 12-lead complete w/read - Clinics (performed today)     Follow-Up with Electrophysiologist        Primary Care Provider Office Phone # Fax #    Denny Fong [phone]       [place] [address] [facility] [address]  [facility] [address]        Equal Access to Services     [facility]: Hadii aad ku hadasho Soomaali, waaxda luqadaha, qaybta kaalmada adeegyada, waxay idiin hayaan adeeg kharash cristy . So [facility] [phone].    ATENCIÓN: Si habla español, tiene a castelan disposición servicios gratuitos de asistencia lingüística. [facility] [phone].    We comply with applicable federal civil rights laws and [place] laws. We do not discriminate on the basis of race, color, national origin, age, disability, sex, sexual orientation, or gender identity.            Thank you!     Thank you for choosing [facility]  for your care. Our goal is always to provide you with excellent care. Hearing back from our patients is one way we can continue to improve our services. Please take a few minutes to complete the written survey that you may receive in the mail after your visit with us. Thank you!             Your Updated Medication List - Protect others around you: Learn how to safely use, store and throw away your medicines at www.disposemymeds.org.          This list is accurate as of 2/15/18 11:23 AM.  Always use your most recent med list.                   Brand Name Dispense Instructions for use Diagnosis    Alpha-Lipoic Acid 200 MG Caps           cholecalciferol 5000 UNITS Caps capsule    vitamin D3     Take 5,000 Units by mouth daily        diltiazem 60 MG tablet    CARDIZEM    5 tablet    Take 1 tab as needed for SVT    Paroxysmal supraventricular tachycardia (H)       doxycycline (Rosacea) 40 MG Cpdr CR capsule    ORACEA      Take 40 mg by mouth daily        Fish Oil 1200 MG Cpdr      Take 1,200 mg by mouth        magnesium 500 MG Tabs      Take 1 tablet by mouth daily        MULTIVITAMIN ADULT PO      Take 1 tablet by mouth daily        VITAMIN B-12 PO      Take 1,000 mcg by mouth daily        ZYRTEC ALLERGY PO      Take 10 mg by mouth daily

## 2018-02-15 NOTE — PROGRESS NOTES
HPI and Plan:   See dictation    Orders Placed This Encounter   Procedures     EKG 12-lead complete w/read - Clinics (performed today)     EP Ablation Procedures       Orders Placed This Encounter   Medications     cholecalciferol (VITAMIN D3) 5000 UNITS CAPS capsule     Sig: Take 5,000 Units by mouth daily       Medications Discontinued During This Encounter   Medication Reason     FLUOXETINE HCL PO Stopped by Patient     HYDROcodone-acetaminophen (NORCO) 5-325 MG per tablet Therapy completed         Encounter Diagnosis   Name Primary?     Paroxysmal supraventricular tachycardia (H)        CURRENT MEDICATIONS:  Current Outpatient Prescriptions   Medication Sig Dispense Refill     cholecalciferol (VITAMIN D3) 5000 UNITS CAPS capsule Take 5,000 Units by mouth daily       Omega-3 Fatty Acids (FISH OIL) 1200 MG CPDR Take 1,200 mg by mouth       magnesium 500 MG TABS Take 1 tablet by mouth daily        Alpha-Lipoic Acid 200 MG CAPS        diltiazem (CARDIZEM) 60 MG tablet Take 1 tab as needed for SVT 5 tablet 0     doxycycline, Rosacea, (ORACEA) 40 MG CPDR Take 40 mg by mouth daily       Multiple Vitamins-Minerals (MULTIVITAMIN ADULT PO) Take 1 tablet by mouth daily        Cyanocobalamin (VITAMIN B-12 PO) Take 1,000 mcg by mouth daily        Cetirizine HCl (ZYRTEC ALLERGY PO) Take 10 mg by mouth daily          ALLERGIES     Allergies   Allergen Reactions     Amoxicillin Rash       PAST MEDICAL HISTORY:  Past Medical History:   Diagnosis Date     Menopausal hot flushes     she takes prozac for that     Rosacea     she takes doxycycline for that     SVT (supraventricular tachycardia) (H)     SVT in April 2017   - vagal manuver effective       PAST SURGICAL HISTORY:  Past Surgical History:   Procedure Laterality Date     LAPAROSCOPIC SALPINGO-OOPHORECTOMY Bilateral 7/11/2017    Procedure: LAPAROSCOPIC SALPINGO-OOPHORECTOMY;  LAPAROSCOPIC BILATERAL SALPING-OOPHORECTOMY;  Surgeon: Keesha Dwyer MD;  Location:  OR  "    wisdom tooth removal         FAMILY HISTORY:  Family History   Problem Relation Age of Onset     Type 2 Diabetes Father      Coronary Artery Disease Mother       52     Ovarian Cancer Sister       40       SOCIAL HISTORY:  Social History     Social History     Marital status:      Spouse name: N/A     Number of children: N/A     Years of education: N/A     Social History Main Topics     Smoking status: Never Smoker     Smokeless tobacco: Never Used     Alcohol use 0.0 oz/week     0 Standard drinks or equivalent per week      Comment: once a week     Drug use: No     Sexual activity: Yes     Partners: Male     Birth control/ protection: Post-menopausal     Other Topics Concern     Parent/Sibling W/ Cabg, Mi Or Angioplasty Before 65f 55m? 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:  Skin:  Negative       Eyes:  Negative      ENT:  Negative      Respiratory:  Positive for cough;shortness of breath (SOB w/palpitations ) r/t allergies    Cardiovascular:  Negative for;lightheadedness Positive for;palpitations    Gastroenterology: Positive for   abd \"tightness\" w/palpitations   Genitourinary:  Negative      Musculoskeletal:  Negative      Neurologic:  Negative      Psychiatric:  Positive for sleep disturbances;anxiety    Heme/Lymph/Imm:  Negative      Endocrine:  Positive for hot flashes (Elevated TSH in ED, PMD to recheck in 2017) patient r/t menopause     [id number]            "

## 2018-02-15 NOTE — LETTER
2/15/2018      Denny Fong MD  [facility] [address]  [facility] [address]      RE: Lilly Diaz       Dear Colleague,    I had the pleasure of seeing Lilly Diaz in the [facility].    Service Date: 02/15/2018      HISTORY OF PRESENT ILLNESS:    It was my pleasure seeing Ms. Lilly Diaz in follow-up of SVT.  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  It resolved with Valsalva maneuvers.  I have not yet seen a 12-lead ECG of her arrhythmia.  When I saw Ms. Diaz back in 05/2017, we discussed treatment options.  At the time Lilly decided to pursue no active treatment.  She had an echocardiogram that showed a structurally normal heart.      Since then she has had 2 episodes of SVT that were quite symptomatic.  Both resolved with Valsalva maneuvers.  Because of these recurrences, she requested the visit today to re-discuss treatment options.        She is otherwise feeling well.  She has had no chest pain, unusual dyspnea or other cardiac symptoms.  She did undergo a prophylactic oophorectomy last summer.      PHYSICAL EXAMINATION:   VITAL SIGNS:  Blood pressure 128/76, pulse 72 and regular, weight 165 cm.   CARDIOVASCULAR:  Regular rhythm without murmur or rub.      DIAGNOSTIC STUDIES:  Her 12-lead ECG today showed sinus rhythm with an RSR prime pattern in lead V1.      IMPRESSION:    1.  Paroxysmal supraventricular tachycardia.  This responds to Valsalva, so it is likely a reentrant SVT.  I repeated the discussion we had back in May.  Specifically, we discussed the options of continued observation, pharmacologic therapy and catheter ablation.        The patient stated she does not want to commit to chronic drug therapy.  She is interested in undergoing catheter ablation.  I went over the technical aspects, risks and benefits of the procedure with the patient and  her , Wilfredo.  I quoted a greater than 95% likelihood of successful ablation for re-entrant SVT with an approximately 1-1.5% risk of serious complication.  Such complications include but are not limited to injury to the AV conduction system requiring pacemaker implantation, DVT, bleeding, vascular injury, phrenic nerve injury, TIA, CVA, and other unforeseen complications.        Lolly had several questions and all were answered.  She has agreed to proceed with SVT ablation.      RECOMMENDATIONS:   A.  Schedule SVT ablation.        It was my pleasure seeing this delightful patient. Time spent was 25 minutes, all discussion.  Please feel free to contact me with any questions.         CONSUELO GODINEZ MD, [facility]         cc:   [place] [address]          D: 02/15/2018   T: 02/15/2018   MT: LAWRENCE      Name:     LOLLY JAVIER   MRN:      [id number]        Account:      [id number]   :      1965           Service Date: 02/15/2018      Document: [id number]           Outpatient Encounter Prescriptions as of 2/15/2018   Medication Sig Dispense Refill     cholecalciferol (VITAMIN D3) 5000 UNITS CAPS capsule Take 5,000 Units by mouth daily       Omega-3 Fatty Acids (FISH OIL) 1200 MG CPDR Take 1,200 mg by mouth       magnesium 500 MG TABS Take 1 tablet by mouth daily        Alpha-Lipoic Acid 200 MG CAPS        diltiazem (CARDIZEM) 60 MG tablet Take 1 tab as needed for SVT 5 tablet 0     doxycycline, Rosacea, (ORACEA) 40 MG CPDR Take 40 mg by mouth daily       Multiple Vitamins-Minerals (MULTIVITAMIN ADULT PO) Take 1 tablet by mouth daily        Cyanocobalamin (VITAMIN B-12 PO) Take 1,000 mcg by mouth daily        Cetirizine HCl (ZYRTEC ALLERGY PO) Take 10 mg by mouth daily        [DISCONTINUED] HYDROcodone-acetaminophen (NORCO) 5-325 MG per tablet Take 1-2 tablets by mouth every 4 hours as needed for other (Moderate to Severe  Pain) 20 tablet 0     [DISCONTINUED] FLUOXETINE HCL PO Take 10 mg by mouth daily       No facility-administered encounter medications on file as of 2/15/2018.        Again, thank you for allowing me to participate in the care of your patient.      Sincerely,    Vito De La Cruz MD     [facility]

## 2018-03-06 RX ORDER — LIDOCAINE 40 MG/G
CREAM TOPICAL
Status: CANCELLED | OUTPATIENT
Start: 2018-03-06

## 2018-03-06 RX ORDER — SODIUM CHLORIDE 450 MG/100ML
INJECTION, SOLUTION INTRAVENOUS CONTINUOUS
Status: CANCELLED | OUTPATIENT
Start: 2018-03-06

## 2018-03-07 ENCOUNTER — TELEPHONE (OUTPATIENT)
Dept: CARDIOLOGY | Facility: CLINIC | Age: 53
End: 2018-03-07

## 2018-03-07 NOTE — TELEPHONE ENCOUNTER
Phone call to patient to review tomorrows prep for SVT ablation. Her  will be  and care giver. She will have clear liquids up to 8 am tomorrow morning and will hold all vitamins and other medications.I told her to take it easy for the first week and that we will call her on Friday for progress check. She will return to see Tami on 4/19 at 8:50 for follow up. All questions answered to her satisfaction. Ana Maria

## 2018-03-08 ENCOUNTER — APPOINTMENT (OUTPATIENT)
Dept: CARDIOLOGY | Facility: CLINIC | Age: 53
End: 2018-03-08
Attending: INTERNAL MEDICINE
Payer: COMMERCIAL

## 2018-03-08 ENCOUNTER — HOSPITAL ENCOUNTER (OUTPATIENT)
Facility: CLINIC | Age: 53
Discharge: HOME | End: 2018-03-08
Attending: INTERNAL MEDICINE | Admitting: INTERNAL MEDICINE
Payer: COMMERCIAL

## 2018-03-08 VITALS
SYSTOLIC BLOOD PRESSURE: 122 MMHG | HEART RATE: 85 BPM | OXYGEN SATURATION: 96 % | HEIGHT: 66 IN | RESPIRATION RATE: 16 BRPM | WEIGHT: 171 LBS | TEMPERATURE: 97.5 F | DIASTOLIC BLOOD PRESSURE: 67 MMHG | BODY MASS INDEX: 27.48 KG/M2

## 2018-03-08 DIAGNOSIS — I47.10 PAROXYSMAL SUPRAVENTRICULAR TACHYCARDIA (H): ICD-10-CM

## 2018-03-08 LAB
ANION GAP SERPL CALCULATED.3IONS-SCNC: 7 MMOL/L (ref 3–14)
B-HCG SERPL-ACNC: 2 IU/L (ref 0–5)
BUN SERPL-MCNC: 16 MG/DL (ref 7–30)
CALCIUM SERPL-MCNC: 8.7 MG/DL (ref 8.5–10.1)
CHLORIDE SERPL-SCNC: 105 MMOL/L (ref 94–109)
CO2 SERPL-SCNC: 26 MMOL/L (ref 20–32)
CREAT SERPL-MCNC: 0.86 MG/DL (ref 0.52–1.04)
ERYTHROCYTE [DISTWIDTH] IN BLOOD BY AUTOMATED COUNT: 13.1 % (ref 10–15)
GFR SERPL CREATININE-BSD FRML MDRD: 69 ML/MIN/1.7M2
GLUCOSE SERPL-MCNC: 90 MG/DL (ref 70–99)
HCT VFR BLD AUTO: 41.5 % (ref 35–47)
HGB BLD-MCNC: 14.2 G/DL (ref 11.7–15.7)
INR PPP: 1.02 (ref 0.86–1.14)
MCH RBC QN AUTO: 28.9 PG (ref 26.5–33)
MCHC RBC AUTO-ENTMCNC: 34.2 G/DL (ref 31.5–36.5)
MCV RBC AUTO: 85 FL (ref 78–100)
PLATELET # BLD AUTO: 199 10E9/L (ref 150–450)
POTASSIUM SERPL-SCNC: 3.6 MMOL/L (ref 3.4–5.3)
RBC # BLD AUTO: 4.91 10E12/L (ref 3.8–5.2)
SODIUM SERPL-SCNC: 138 MMOL/L (ref 133–144)
WBC # BLD AUTO: 6.9 10E9/L (ref 4–11)

## 2018-03-08 PROCEDURE — 25000128 H RX IP 250 OP 636: Performed by: INTERNAL MEDICINE

## 2018-03-08 PROCEDURE — 25000125 ZZHC RX 250: Performed by: INTERNAL MEDICINE

## 2018-03-08 PROCEDURE — 27210795 ZZH PAD DEFIB QUICK CR4

## 2018-03-08 PROCEDURE — 27210782 ZZH KIT EP TOTES DISP CR7

## 2018-03-08 PROCEDURE — 99152 MOD SED SAME PHYS/QHP 5/>YRS: CPT

## 2018-03-08 PROCEDURE — C1732 CATH, EP, DIAG/ABL, 3D/VECT: HCPCS

## 2018-03-08 PROCEDURE — 93653 COMPRE EP EVAL TX SVT: CPT

## 2018-03-08 PROCEDURE — 40000065 ZZH STATISTIC EKG NON-CHARGEABLE

## 2018-03-08 PROCEDURE — 85027 COMPLETE CBC AUTOMATED: CPT | Performed by: INTERNAL MEDICINE

## 2018-03-08 PROCEDURE — 99153 MOD SED SAME PHYS/QHP EA: CPT

## 2018-03-08 PROCEDURE — 84702 CHORIONIC GONADOTROPIN TEST: CPT | Performed by: INTERNAL MEDICINE

## 2018-03-08 PROCEDURE — 40000852 ZZH STATISTIC HEART CATH LAB OR EP LAB

## 2018-03-08 PROCEDURE — 93010 ELECTROCARDIOGRAM REPORT: CPT | Performed by: INTERNAL MEDICINE

## 2018-03-08 PROCEDURE — 99152 MOD SED SAME PHYS/QHP 5/>YRS: CPT | Performed by: INTERNAL MEDICINE

## 2018-03-08 PROCEDURE — 27210995 ZZH RX 272: Performed by: INTERNAL MEDICINE

## 2018-03-08 PROCEDURE — 93623 PRGRMD STIMJ&PACG IV RX NFS: CPT

## 2018-03-08 PROCEDURE — 93621 COMP EP EVL L PAC&REC C SINS: CPT

## 2018-03-08 PROCEDURE — 93613 INTRACARDIAC EPHYS 3D MAPG: CPT

## 2018-03-08 PROCEDURE — 93005 ELECTROCARDIOGRAM TRACING: CPT

## 2018-03-08 PROCEDURE — C1730 CATH, EP, 19 OR FEW ELECT: HCPCS

## 2018-03-08 PROCEDURE — 93653 COMPRE EP EVAL TX SVT: CPT | Performed by: INTERNAL MEDICINE

## 2018-03-08 PROCEDURE — 80048 BASIC METABOLIC PNL TOTAL CA: CPT | Performed by: INTERNAL MEDICINE

## 2018-03-08 PROCEDURE — 93621 COMP EP EVL L PAC&REC C SINS: CPT | Mod: 26 | Performed by: INTERNAL MEDICINE

## 2018-03-08 PROCEDURE — 93623 PRGRMD STIMJ&PACG IV RX NFS: CPT | Mod: 26 | Performed by: INTERNAL MEDICINE

## 2018-03-08 PROCEDURE — 85610 PROTHROMBIN TIME: CPT | Performed by: INTERNAL MEDICINE

## 2018-03-08 PROCEDURE — 40000235 ZZH STATISTIC TELEMETRY

## 2018-03-08 PROCEDURE — 27210796 ZZH PAD EXTRNAL REFRENCE CARDIAC MAPPING CR14

## 2018-03-08 PROCEDURE — 93613 INTRACARDIAC EPHYS 3D MAPG: CPT | Performed by: INTERNAL MEDICINE

## 2018-03-08 RX ORDER — NALOXONE HYDROCHLORIDE 0.4 MG/ML
0.4 INJECTION, SOLUTION INTRAMUSCULAR; INTRAVENOUS; SUBCUTANEOUS EVERY 5 MIN PRN
Status: DISCONTINUED | OUTPATIENT
Start: 2018-03-08 | End: 2018-03-08 | Stop reason: HOSPADM

## 2018-03-08 RX ORDER — DIPHENHYDRAMINE HYDROCHLORIDE 50 MG/ML
25-50 INJECTION INTRAMUSCULAR; INTRAVENOUS
Status: DISCONTINUED | OUTPATIENT
Start: 2018-03-08 | End: 2018-03-08 | Stop reason: HOSPADM

## 2018-03-08 RX ORDER — FUROSEMIDE 10 MG/ML
20-100 INJECTION INTRAMUSCULAR; INTRAVENOUS
Status: DISCONTINUED | OUTPATIENT
Start: 2018-03-08 | End: 2018-03-08 | Stop reason: HOSPADM

## 2018-03-08 RX ORDER — HEPARIN SODIUM 1000 [USP'U]/ML
1000-10000 INJECTION, SOLUTION INTRAVENOUS; SUBCUTANEOUS EVERY 5 MIN PRN
Status: DISCONTINUED | OUTPATIENT
Start: 2018-03-08 | End: 2018-03-08 | Stop reason: HOSPADM

## 2018-03-08 RX ORDER — LORAZEPAM 2 MG/ML
.5-2 INJECTION INTRAMUSCULAR EVERY 10 MIN PRN
Status: DISCONTINUED | OUTPATIENT
Start: 2018-03-08 | End: 2018-03-08 | Stop reason: HOSPADM

## 2018-03-08 RX ORDER — PROTAMINE SULFATE 10 MG/ML
5-40 INJECTION, SOLUTION INTRAVENOUS EVERY 10 MIN PRN
Status: DISCONTINUED | OUTPATIENT
Start: 2018-03-08 | End: 2018-03-08 | Stop reason: HOSPADM

## 2018-03-08 RX ORDER — IBUTILIDE FUMARATE 1 MG/10ML
1 INJECTION, SOLUTION INTRAVENOUS
Status: DISCONTINUED | OUTPATIENT
Start: 2018-03-08 | End: 2018-03-08 | Stop reason: HOSPADM

## 2018-03-08 RX ORDER — NALOXONE HYDROCHLORIDE 0.4 MG/ML
.1-.4 INJECTION, SOLUTION INTRAMUSCULAR; INTRAVENOUS; SUBCUTANEOUS
Status: DISCONTINUED | OUTPATIENT
Start: 2018-03-08 | End: 2018-03-08 | Stop reason: HOSPADM

## 2018-03-08 RX ORDER — PROTAMINE SULFATE 10 MG/ML
1-5 INJECTION, SOLUTION INTRAVENOUS
Status: DISCONTINUED | OUTPATIENT
Start: 2018-03-08 | End: 2018-03-08 | Stop reason: HOSPADM

## 2018-03-08 RX ORDER — ATROPINE SULFATE 0.1 MG/ML
.5-1 INJECTION INTRAVENOUS
Status: DISCONTINUED | OUTPATIENT
Start: 2018-03-08 | End: 2018-03-08 | Stop reason: HOSPADM

## 2018-03-08 RX ORDER — ONDANSETRON 2 MG/ML
4 INJECTION INTRAMUSCULAR; INTRAVENOUS EVERY 4 HOURS PRN
Status: DISCONTINUED | OUTPATIENT
Start: 2018-03-08 | End: 2018-03-08 | Stop reason: HOSPADM

## 2018-03-08 RX ORDER — FLUMAZENIL 0.1 MG/ML
0.2 INJECTION, SOLUTION INTRAVENOUS
Status: DISCONTINUED | OUTPATIENT
Start: 2018-03-08 | End: 2018-03-08 | Stop reason: HOSPADM

## 2018-03-08 RX ORDER — LIDOCAINE HYDROCHLORIDE 10 MG/ML
10-30 INJECTION, SOLUTION EPIDURAL; INFILTRATION; INTRACAUDAL; PERINEURAL
Status: DISCONTINUED | OUTPATIENT
Start: 2018-03-08 | End: 2018-03-08 | Stop reason: HOSPADM

## 2018-03-08 RX ORDER — LIDOCAINE HYDROCHLORIDE AND EPINEPHRINE 10; 10 MG/ML; UG/ML
10-30 INJECTION, SOLUTION INFILTRATION; PERINEURAL
Status: DISCONTINUED | OUTPATIENT
Start: 2018-03-08 | End: 2018-03-08 | Stop reason: HOSPADM

## 2018-03-08 RX ORDER — LIDOCAINE 40 MG/G
CREAM TOPICAL
Status: DISCONTINUED | OUTPATIENT
Start: 2018-03-08 | End: 2018-03-08 | Stop reason: HOSPADM

## 2018-03-08 RX ORDER — ADENOSINE 3 MG/ML
6-12 INJECTION, SOLUTION INTRAVENOUS EVERY 5 MIN PRN
Status: DISCONTINUED | OUTPATIENT
Start: 2018-03-08 | End: 2018-03-08 | Stop reason: HOSPADM

## 2018-03-08 RX ORDER — ACETAMINOPHEN 325 MG/1
650 TABLET ORAL EVERY 4 HOURS PRN
Status: DISCONTINUED | OUTPATIENT
Start: 2018-03-08 | End: 2018-03-08 | Stop reason: HOSPADM

## 2018-03-08 RX ORDER — CALCIUM CARBONATE 500(1250)
1 TABLET ORAL 2 TIMES DAILY
COMMUNITY

## 2018-03-08 RX ORDER — IBUTILIDE FUMARATE 1 MG/10ML
0.01 INJECTION, SOLUTION INTRAVENOUS
Status: DISCONTINUED | OUTPATIENT
Start: 2018-03-08 | End: 2018-03-08 | Stop reason: HOSPADM

## 2018-03-08 RX ORDER — OXYCODONE AND ACETAMINOPHEN 5; 325 MG/1; MG/1
1 TABLET ORAL EVERY 4 HOURS PRN
Status: DISCONTINUED | OUTPATIENT
Start: 2018-03-08 | End: 2018-03-08 | Stop reason: HOSPADM

## 2018-03-08 RX ORDER — PROMETHAZINE HYDROCHLORIDE 25 MG/ML
6.25-25 INJECTION, SOLUTION INTRAMUSCULAR; INTRAVENOUS EVERY 4 HOURS PRN
Status: DISCONTINUED | OUTPATIENT
Start: 2018-03-08 | End: 2018-03-08 | Stop reason: HOSPADM

## 2018-03-08 RX ORDER — SODIUM CHLORIDE 450 MG/100ML
INJECTION, SOLUTION INTRAVENOUS CONTINUOUS
Status: DISCONTINUED | OUTPATIENT
Start: 2018-03-08 | End: 2018-03-08 | Stop reason: HOSPADM

## 2018-03-08 RX ORDER — FENTANYL CITRATE 50 UG/ML
25-50 INJECTION, SOLUTION INTRAMUSCULAR; INTRAVENOUS
Status: DISCONTINUED | OUTPATIENT
Start: 2018-03-08 | End: 2018-03-08 | Stop reason: HOSPADM

## 2018-03-08 RX ORDER — BUPIVACAINE HYDROCHLORIDE 2.5 MG/ML
10-30 INJECTION, SOLUTION EPIDURAL; INFILTRATION; INTRACAUDAL
Status: DISCONTINUED | OUTPATIENT
Start: 2018-03-08 | End: 2018-03-08 | Stop reason: HOSPADM

## 2018-03-08 RX ORDER — LIDOCAINE HYDROCHLORIDE 10 MG/ML
10-30 INJECTION, SOLUTION EPIDURAL; INFILTRATION; INTRACAUDAL; PERINEURAL
Status: COMPLETED | OUTPATIENT
Start: 2018-03-08 | End: 2018-03-08

## 2018-03-08 RX ORDER — KETOROLAC TROMETHAMINE 30 MG/ML
15-30 INJECTION, SOLUTION INTRAMUSCULAR; INTRAVENOUS
Status: DISCONTINUED | OUTPATIENT
Start: 2018-03-08 | End: 2018-03-08 | Stop reason: HOSPADM

## 2018-03-08 RX ORDER — DOBUTAMINE HYDROCHLORIDE 200 MG/100ML
5-40 INJECTION INTRAVENOUS CONTINUOUS PRN
Status: DISCONTINUED | OUTPATIENT
Start: 2018-03-08 | End: 2018-03-08 | Stop reason: HOSPADM

## 2018-03-08 RX ORDER — MORPHINE SULFATE 2 MG/ML
1-2 INJECTION, SOLUTION INTRAMUSCULAR; INTRAVENOUS EVERY 5 MIN PRN
Status: DISCONTINUED | OUTPATIENT
Start: 2018-03-08 | End: 2018-03-08 | Stop reason: HOSPADM

## 2018-03-08 RX ADMIN — MIDAZOLAM 1 MG: 1 INJECTION INTRAMUSCULAR; INTRAVENOUS at 14:46

## 2018-03-08 RX ADMIN — LIDOCAINE HYDROCHLORIDE 10 ML: 10 INJECTION, SOLUTION EPIDURAL; INFILTRATION; INTRACAUDAL; PERINEURAL at 13:54

## 2018-03-08 RX ADMIN — ADENOSINE 6 MG: 3 INJECTION, SOLUTION INTRAVENOUS at 14:33

## 2018-03-08 RX ADMIN — Medication 2 MCG/MIN: at 14:47

## 2018-03-08 RX ADMIN — Medication 1 MCG/MIN: at 14:19

## 2018-03-08 RX ADMIN — Medication 3 MCG/MIN: at 14:32

## 2018-03-08 RX ADMIN — SODIUM CHLORIDE: 4.5 INJECTION, SOLUTION INTRAVENOUS at 11:42

## 2018-03-08 RX ADMIN — FENTANYL CITRATE 50 MCG: 50 INJECTION, SOLUTION INTRAMUSCULAR; INTRAVENOUS at 13:53

## 2018-03-08 RX ADMIN — MIDAZOLAM 1 MG: 1 INJECTION INTRAMUSCULAR; INTRAVENOUS at 14:39

## 2018-03-08 RX ADMIN — MIDAZOLAM 1 MG: 1 INJECTION INTRAMUSCULAR; INTRAVENOUS at 13:53

## 2018-03-08 RX ADMIN — FENTANYL CITRATE 50 MCG: 50 INJECTION, SOLUTION INTRAMUSCULAR; INTRAVENOUS at 14:39

## 2018-03-08 NOTE — IP AVS SNAPSHOT
MRN:[id number]                      After Visit Summary   3/8/2018    Lilly Diaz    MRN: [id number]           Visit Information        Department      3/8/2018 11:04 AM [facility]          Review of your medicines      CONTINUE these medicines which have NOT CHANGED        Dose / Directions    Alpha-Lipoic Acid 200 MG Caps        Refills:  0       calcium carbonate 1250 MG tablet   Commonly known as:  OS-KELECHI 500 mg [place]. Ca        Dose:  1 tablet   Take 1 tablet by mouth 2 times daily   Refills:  0       cholecalciferol 5000 UNITS Caps capsule   Commonly known as:  vitamin D3        Dose:  5000 Units   Take 5,000 Units by mouth daily   Refills:  0       diltiazem 60 MG tablet   Commonly known as:  CARDIZEM   Used for:  Paroxysmal supraventricular tachycardia (H)        Take 1 tab as needed for SVT   Quantity:  5 tablet   Refills:  0       doxycycline (Rosacea) 40 MG Cpdr CR capsule   Commonly known as:  ORACEA        Dose:  40 mg   Take 40 mg by mouth daily   Refills:  0       Fish Oil 1200 MG Cpdr        Dose:  1200 mg   Take 1,200 mg by mouth   Refills:  0       magnesium 500 MG Tabs        Dose:  1 tablet   Take 1 tablet by mouth daily   Refills:  0       MULTIVITAMIN ADULT PO        Dose:  1 tablet   Take 1 tablet by mouth daily   Refills:  0       VITAMIN B-12 PO        Dose:  1000 mcg   Take 1,000 mcg by mouth daily   Refills:  0       ZYRTEC ALLERGY PO        Dose:  10 mg   Take 10 mg by mouth daily   Refills:  0                Protect others around you: Learn how to safely use, store and throw away your medicines at www.disposemymeds.org.         Follow-ups after your visit        Your next 10 appointments already scheduled     Apr 19, 2018  8:50 AM CDT   [facility] EP RETURN with GRAYSON Menchaca CNP   [facility] ([facility])    [address] [place], [place] [address]  [facility] [address] [phone]                Care Instructions        After Care Instructions     Discharge Instructions - Follow up with [facility] Heart Nurse Practitioner          Follow up with EP Nurse Practitioner at [facility] Clinic of patient preference in 4-6 weeks.            Discharge Instructions - No driving for 1 day       No driving for 1 day and limit to necessary driving for 1 week.                  Further instructions from your care team       SVT Ablation Discharge Instructions - Femoral     After you go home:      Have an adult stay with you until tomorrow.    You may resume your normal diet.       For 24 hours - due to the sedation you received:    Relax and take it easy.    Do NOT make any important or legal decisions.    Do NOT drive or operate machines at home or at work.    Do NOT drink alcohol.    Care of Groin Puncture Site:      For the first 24 hrs - check the puncture site every 1-2 hours while awake.    For 2 days, when you cough, sneeze, laugh or move your bowels, hold your hand over the puncture site and press firmly.    Remove the bandaid after 24 hours. If there is minor oozing, apply another bandaid and remove it after 12 hours.    It is normal to have a small bruise or pea size lump at the site.    You may shower tomorrow.  Do NOT take a bath, or use a hot tub or pool for at least 3 days. Do NOT scrub the site. Do not use lotion or powder near the puncture site.    Activity:            For 2 days:    No stooping or squatting    Do NOT do any heavy activity such as exercise, lifting, or straining.     No housework, yard work or any activity that make you sweat    Do NOT lift more than 10 pounds    Bleeding:      If you start bleeding from the site in your groin, lie down flat and press firmly on the site for 10 minutes.     Once bleeding stops, lay flat for 2 hours.    Call [facility] Clinic as soon as you can.       Call 911 right away if you have heavy bleeding or bleeding that does not stop.      Medicines:      Take your  medications, including blood thinners, unless your provider tells you not to.    If you have stopped any medicines, check with your provider about when to restart them.    If you have pain or shortness of breath, you may take Advil (ibuprofen) or Tylenol (acetaminophen).    Follow Up Appointments:      An appointment has been set up for you for follow-up care    You will receive a phone call tomorrow morning from an RN - Alistair Torrez or Nancy.    Call the clinic if:      You have increased pain or a large or growing hard lump around the site.    The site is red, swollen, hot or tender.    Blood or fluid is draining from the site.    You have chills or a fever greater than 101 F (38 C).    Your leg feels numb, cool or changes color.    Increased pain in the chest and/or groin.    Increased shortness of breath    Chest pain not relieved by Tylenol or Advil    New pain in the back or belly that you cannot control with Tylenol.    Recurrent irregular or fast heart rate lasting over 2 hours.    Any questions or concerns.    Heart rhythms:    You may have some irregular heartbeats. These feel very strong. They may make you feel that the fast heart rhythm is going to start again.  Give it time. The irregular beats should occur less often.       [facility] Heart Care:    [phone] ( 8am-5pm M-F)  Alistair Torrez or Nancy    [phone] [facility] (7 days a week)           Additional Information About Your Visit        ORVIBOhart Information     [employer] gives you secure access to your electronic health record. If you see a primary care provider, you can also send messages to your care team and make appointments. If you have questions, please call your primary care clinic.  If you do not have a primary care provider, please call [phone] and they will assist you.        Care EveryWhere ID     This is your Care EveryWhere ID. This could be used by other organizations to access your [place] medical records  [id number]       "  Your Vitals Were     Blood Pressure Pulse Temperature Respirations Height Weight    114/70 85 97.5  F (36.4  C) (Oral) 16 1.676 m (5' 6\") 77.6 kg (171 lb)    Pulse Oximetry BMI (Body Mass Index)                97% 27.6 kg/m2           Primary Care Provider Office Phone # Fax #    Denny Fong [phone]      Equal Access to Services     [facility] : Hadii aad ku hadasho Soomaali, waaxda luqadaha, qaybta kaalmada adeegyada, waxay idiin hayaan adeeg kharash la'rainn . So [facility] [phone].    ATENCIÓN: Si krupa esposito, tiene a castelan disposición servicios gratuitos de asistencia lingüística. [facility] [phone].    We comply with applicable federal civil rights laws and [place] laws. We do not discriminate on the basis of race, color, national origin, age, disability, sex, sexual orientation, or gender identity.            Thank you!     Thank you for choosing [facility] for your care. Our goal is always to provide you with excellent care. Hearing back from our patients is one way we can continue to improve our services. Please take a few minutes to complete the written survey that you may receive in the mail after you visit with us. Thank you!             Medication List: This is a list of all your medications and when to take them. Check marks below indicate your daily home schedule. Keep this list as a reference.      Medications           Morning Afternoon Evening Bedtime As Needed    Alpha-Lipoic Acid 200 MG Caps                                calcium carbonate 1250 MG tablet   Commonly known as:  OS-KELECHI 500 mg [place]. Ca   Take 1 tablet by mouth 2 times daily                                cholecalciferol 5000 UNITS Caps capsule   Commonly known as:  vitamin D3   Take 5,000 Units by mouth daily                                diltiazem 60 MG tablet   Commonly known as:  CARDIZEM   Take 1 tab as needed for SVT                                doxycycline (Rosacea) 40 MG Cpdr CR capsule "   Commonly known as:  ORACEA   Take 40 mg by mouth daily                                Fish Oil 1200 MG Cpdr   Take 1,200 mg by mouth                                magnesium 500 MG Tabs   Take 1 tablet by mouth daily                                MULTIVITAMIN ADULT PO   Take 1 tablet by mouth daily                                VITAMIN B-12 PO   Take 1,000 mcg by mouth daily                                ZYRTEC ALLERGY PO   Take 10 mg by mouth daily

## 2018-03-08 NOTE — IP AVS SNAPSHOT
[place] [address]    [place] [address]    Phone:  [phone]                                       After Visit Summary   3/8/2018    Lilly Diaz    MRN: [id number]           After Visit Summary Signature Page     I have received my discharge instructions, and my questions have been answered. I have discussed any challenges I see with this plan with the nurse or doctor.    ..........................................................................................................................................  Patient/Patient Representative Signature      ..........................................................................................................................................  Patient Representative Print Name and Relationship to Patient    ..................................................               ................................................  Date                                            Time    ..........................................................................................................................................  Reviewed by Signature/Title    ...................................................              ..............................................  Date                                                            Time

## 2018-03-08 NOTE — DISCHARGE INSTRUCTIONS
SVT Ablation Discharge Instructions - Femoral     After you go home:      Have an adult stay with you until tomorrow.    You may resume your normal diet.       For 24 hours - due to the sedation you received:    Relax and take it easy.    Do NOT make any important or legal decisions.    Do NOT drive or operate machines at home or at work.    Do NOT drink alcohol.    Care of Groin Puncture Site:      For the first 24 hrs - check the puncture site every 1-2 hours while awake.    For 2 days, when you cough, sneeze, laugh or move your bowels, hold your hand over the puncture site and press firmly.    Remove the bandaid after 24 hours. If there is minor oozing, apply another bandaid and remove it after 12 hours.    It is normal to have a small bruise or pea size lump at the site.    You may shower tomorrow.  Do NOT take a bath, or use a hot tub or pool for at least 3 days. Do NOT scrub the site. Do not use lotion or powder near the puncture site.    Activity:            For 2 days:    No stooping or squatting    Do NOT do any heavy activity such as exercise, lifting, or straining.     No housework, yard work or any activity that make you sweat    Do NOT lift more than 10 pounds    Bleeding:      If you start bleeding from the site in your groin, lie down flat and press firmly on the site for 10 minutes.     Once bleeding stops, lay flat for 2 hours.    Call [facility] as soon as you can.       Call 911 right away if you have heavy bleeding or bleeding that does not stop.      Medicines:      Take your medications, including blood thinners, unless your provider tells you not to.    If you have stopped any medicines, check with your provider about when to restart them.    If you have pain or shortness of breath, you may take Advil (ibuprofen) or Tylenol (acetaminophen).    Follow Up Appointments:      An appointment has been set up for you for follow-up care    You will receive a phone call tomorrow morning from an  RN - Alistair Torrez or Nancy.    Call the clinic if:      You have increased pain or a large or growing hard lump around the site.    The site is red, swollen, hot or tender.    Blood or fluid is draining from the site.    You have chills or a fever greater than 101 F (38 C).    Your leg feels numb, cool or changes color.    Increased pain in the chest and/or groin.    Increased shortness of breath    Chest pain not relieved by Tylenol or Advil    New pain in the back or belly that you cannot control with Tylenol.    Recurrent irregular or fast heart rate lasting over 2 hours.    Any questions or concerns.    Heart rhythms:    You may have some irregular heartbeats. These feel very strong. They may make you feel that the fast heart rhythm is going to start again.  Give it time. The irregular beats should occur less often.       [facility] Care:    [phone] ( 8am-5pm M-F)  Alistair Torrez or Nancy    [phone] [facility] (7 days a week)

## 2018-03-09 ENCOUNTER — TELEPHONE (OUTPATIENT)
Dept: CARDIOLOGY | Facility: CLINIC | Age: 53
End: 2018-03-09

## 2018-03-09 NOTE — TELEPHONE ENCOUNTER
Spoke to patient follow up to SVT ablation done on 3/8. Patient reports feeling well.  Reports no issues from groin sites.  She reports she has not removed the dressings yet, encouraged patient to remove dressings and keep open to air.  Patient received discharge instructions and had no questions related to restrictions or symptoms to report.  Follow up appt scheduled 4/19 with [facility].  Patient has direct number to clinic if she has any further questions. KENDRA Haider

## 2018-03-09 NOTE — PROGRESS NOTES
Patients 4 hour bedrest complete s/p SVT ablation.  Denies pain, tolerates PO, voiding, VS WNL, SR, and right groin site is CDI without bleeding or hematoma.  Patient states she has no further questions regarding DC home instructions.  DC home with .

## 2018-03-10 LAB — INTERPRETATION ECG - MUSE: NORMAL

## 2018-04-19 ENCOUNTER — OFFICE VISIT (OUTPATIENT)
Dept: CARDIOLOGY | Facility: CLINIC | Age: 53
End: 2018-04-19
Payer: COMMERCIAL

## 2018-04-19 VITALS
BODY MASS INDEX: 29.22 KG/M2 | SYSTOLIC BLOOD PRESSURE: 124 MMHG | HEART RATE: 72 BPM | WEIGHT: 175.4 LBS | HEIGHT: 65 IN | DIASTOLIC BLOOD PRESSURE: 78 MMHG

## 2018-04-19 DIAGNOSIS — I47.10 PAROXYSMAL SUPRAVENTRICULAR TACHYCARDIA (H): Primary | ICD-10-CM

## 2018-04-19 PROCEDURE — 99213 OFFICE O/P EST LOW 20 MIN: CPT | Mod: 25 | Performed by: NURSE PRACTITIONER

## 2018-04-19 PROCEDURE — 93000 ELECTROCARDIOGRAM COMPLETE: CPT | Performed by: NURSE PRACTITIONER

## 2018-04-19 NOTE — MR AVS SNAPSHOT
After Visit Summary   4/19/2018    Lilly Diaz    MRN: [id number]           Patient Information     Date Of Birth          1965        Visit Information        Provider Department      4/19/2018 8:50 AM Tami Tovar APRN [facility]        Today's Diagnoses     Paroxysmal supraventricular tachycardia (H)    -  1      Care Instructions    Call us if you have problems.        You will receive all normal lab and testing results via MyChart or mail if not reviewed in clinic today. Please contact our office if you need assistance with setting up MyChart.    If you need a medication refill please contact your pharmacy. Please allow 3 business days for your refill to be completed.     As always, thank you for trusting us with your health care needs!    If you have any questions regarding your visit please contact your care team:   Cardiology  Telephone Number    Af[facility] RNs  Lore Sainz, and Nancy [phone]     Call for Electrophysiology procedure scheduling concerns  Tenisha Chaudhry [phone]   Device Clinic (Pacemakers, ICDs, Loop)   RN's :      Rika Lagos Eva, DESIREE Gibson, Livier Avelar During business hours:   [phone]                     Follow-ups after your visit        Your next 10 appointments already scheduled     May 10, 2018  6:00 PM CDT   [facility]    [address] [facility] [address] [phone]           Bring a current list of meds and any records pertaining to this visit. For Physicals, please bring immunization records and any forms needing to be filled out. Please arrive 10 minutes early to complete paperwork.              Who to contact     If you have questions or need follow up information about today's clinic visit or your schedule please contact [facility] directly at [phone].  Normal or  "non-critical lab and imaging results will be communicated to you by MyChart, letter or phone within 4 business days after the clinic has received the results. If you do not hear from us within 7 days, please contact the clinic through [employer]Pandat or phone. If you have a critical or abnormal lab result, we will notify you by phone as soon as possible.  Submit refill requests through Boomlagoon or call your pharmacy and they will forward the refill request to us. Please allow 3 business days for your refill to be completed.          Additional Information About Your Visit        Boomlagoon Information     [employer] gives you secure access to your electronic health record. If you see a primary care provider, you can also send messages to your care team and make appointments. If you have questions, please call your primary care clinic.  If you do not have a primary care provider, please call [phone] and they will assist you.        Care EveryWhere ID     This is your Care EveryWhere ID. This could be used by other organizations to access your [place] medical records  [id number]        Your Vitals Were     Pulse Height BMI (Body Mass Index)             72 1.651 m (5' 5\") 29.19 kg/m2          Blood Pressure from Last 3 Encounters:   04/19/18 124/78   03/08/18 122/67   02/15/18 128/76    Weight from Last 3 Encounters:   04/19/18 79.6 kg (175 lb 6.4 oz)   03/08/18 77.6 kg (171 lb)   07/11/17 78.9 kg (174 lb)              We Performed the Following     EKG 12-lead complete w/read - Clinics (performed today)          Today's Medication Changes          These changes are accurate as of 4/19/18  9:08 AM.  If you have any questions, ask your nurse or doctor.               Stop taking these medicines if you haven't already. Please contact your care team if you have questions.     diltiazem 60 MG tablet   Commonly known as:  CARDIZEM   Stopped by:  Tami Tovar APRN CNP                    Primary Care Provider Office " Phone # Fax #    Denny Fong [phone]       [facility] [address] [facility] [address]  [facility] [address]        Equal Access to Services     MIL GAVIN : Hadruth odonnell catherineo Sobrooklynali, waaxda luqadaha, qaybta kaalmada adeolayinkayada, joel resendizcelso tellezolayinka ware cristy bauer. So [facility] [phone].    ATENCIÓN: Si habla español, tiene a castelan disposición servicios gratuitos de asistencia lingüística. Llame al [phone].    We comply with applicable federal civil rights laws and [place] laws. We do not discriminate on the basis of race, color, national origin, age, disability, sex, sexual orientation, or gender identity.            Thank you!     Thank you for choosing [facility]  for your care. Our goal is always to provide you with excellent care. Hearing back from our patients is one way we can continue to improve our services. Please take a few minutes to complete the written survey that you may receive in the mail after your visit with us. Thank you!             Your Updated Medication List - Protect others around you: Learn how to safely use, store and throw away your medicines at www.disposemymeds.org.          This list is accurate as of 4/19/18  9:08 AM.  Always use your most recent med list.                   Brand Name Dispense Instructions for use Diagnosis    Alpha-Lipoic Acid 200 MG Caps      Take by mouth daily        calcium carbonate 1250 MG tablet    OS-KELECHI 500 mg [place]. Ca     Take 1 tablet by mouth 2 times daily        cholecalciferol 5000 units Caps capsule    vitamin D3     Take 5,000 Units by mouth daily        doxycycline (Rosacea) 40 MG Cpdr CR capsule    ORACEA     Take 40 mg by mouth daily        Fish Oil 1200 MG Cpdr      Take 1,200 mg by mouth daily        magnesium 500 MG Tabs      Take 1 tablet by mouth daily        MULTIVITAMIN ADULT PO      Take 1 tablet by mouth daily        VITAMIN B-12 PO      Take 1,000 mcg by mouth  daily        ZYRTEC ALLERGY PO      Take 10 mg by mouth daily

## 2018-04-19 NOTE — LETTER
4/19/2018    Denny Fong MD  [facility] [address]  [facility] [address]    RE: Lilly Diaz       Dear Colleague,    I had the pleasure of seeing Lilly Diaz in the [facility].    HPI:  Lilly Diaz is a 53 year old female who is a patient of Dr. De La Cruz.  She returns for follow up after successful typical AVRNT ablation on 3/8/18, per Dr. De La Cruz, non-sustained AT was also induced longest AT was 6-7 sec but not targeted with ablation.    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  Successful AVNRT ablation    Today Lilly Diaz presents for feeling overall well and denies any palpitations.  Currently she is feeling a palpitation but does not go into a rapid heartbeat.  Denies chest pain or pressure, headaches, dizziness, syncope, angina, dyspnea at rest or with exertion, dry cough, palpitations, orthopnea, PND, abdominal pain, abdominal edema, pedal edema, or claudication.        Presenting EKG:   Normal sinus rhythm with an incomplete RBBB ventricular rate 69 bpm      ASSESSMENT AND PLAN    (I47.1) Paroxysmal supraventricular tachycardia s/p successful typical AVRNT ablation on 3/8/18.  Patient his recovered from procedure  Today's EKG is normal sinus rhythm  Patient denies any feelings of palpitation  Continue diltiazem 60 mg as needed  He can return to clinic as needed      Patient expresses understanding and agreement with the plan.     I appreciate the chance to help with Lilly Diaz Please let me know if you have any questions or concerns.    Tami Tovar, GRAYSON, CNP    This note was completed in part using Dragon voice recognition software. Although reviewed after completion, some word and grammatical errors may occur.    Orders Placed This Encounter   Procedures     EKG 12-lead complete w/read - Clinics (performed today)     No orders of the  defined types were placed in this encounter.    Medications Discontinued During This Encounter   Medication Reason     diltiazem (CARDIZEM) 60 MG tablet          Encounter Diagnosis   Name Primary?     Paroxysmal supraventricular tachycardia (H) Yes       CURRENT MEDICATIONS:  Current Outpatient Prescriptions   Medication Sig Dispense Refill     Alpha-Lipoic Acid 200 MG CAPS Take by mouth daily        calcium carbonate (OS-KELECHI 500 MG [place]. [place]) 1250 MG tablet Take 1 tablet by mouth 2 times daily       Cetirizine HCl (ZYRTEC ALLERGY PO) Take 10 mg by mouth daily        cholecalciferol (VITAMIN D3) 5000 UNITS CAPS capsule Take 5,000 Units by mouth daily       Cyanocobalamin (VITAMIN B-12 PO) Take 1,000 mcg by mouth daily        doxycycline, Rosacea, (ORACEA) 40 MG CPDR Take 40 mg by mouth daily       magnesium 500 MG TABS Take 1 tablet by mouth daily        Multiple Vitamins-Minerals (MULTIVITAMIN ADULT PO) Take 1 tablet by mouth daily        Omega-3 Fatty Acids (FISH OIL) 1200 MG CPDR Take 1,200 mg by mouth daily          ALLERGIES     Allergies   Allergen Reactions     Amoxicillin Rash       PAST MEDICAL HISTORY:  Past Medical History:   Diagnosis Date     Menopausal hot flushes     she takes prozac for that     Rosacea     she takes doxycycline for that     SVT (supraventricular tachycardia) (H)     SVT in 2017   - vagal manuver effective       PAST SURGICAL HISTORY:  Past Surgical History:   Procedure Laterality Date     LAPAROSCOPIC SALPINGO-OOPHORECTOMY Bilateral 2017    Procedure: LAPAROSCOPIC SALPINGO-OOPHORECTOMY;  LAPAROSCOPIC BILATERAL SALPING-OOPHORECTOMY;  Surgeon: Keesha Dwyer MD;  Location: [facility] OR     wisdom tooth removal         FAMILY HISTORY:  Family History   Problem Relation Age of Onset     Type 2 Diabetes Father      Coronary Artery Disease Mother       52     Ovarian Cancer Sister       40       SOCIAL HISTORY:  Social History     Social History     Marital status:  "     Spouse name: N/A     Number of children: N/A     Years of education: N/A     Social History Main Topics     Smoking status: Never Smoker     Smokeless tobacco: Never Used     Alcohol use 0.0 oz/week     0 Standard drinks or equivalent per week      Comment: once a week     Drug use: No     Sexual activity: Yes     Partners: Male     Birth control/ protection: Post-menopausal     Other Topics Concern     Parent/Sibling W/ Cabg, Mi Or Angioplasty Before 65f 55m? 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:  Skin:  Negative     Eyes:  Negative    ENT:  Negative    Respiratory:  Negative    Cardiovascular:  Negative;lightheadedness;dizziness;syncope or near-syncope;cyanosis;fatigue;edema;exercise intolerance Positive for  Gastroenterology: Negative    Genitourinary:  Negative    Musculoskeletal:  Negative    Neurologic:  Negative    Psychiatric:  Positive for anxiety;depression;sleep disturbances  Heme/Lymph/Imm:  Positive for night sweats;allergies  Endocrine:  Positive for night sweats    Physical Exam:  Vitals: /78 (BP Location: Left arm, Cuff Size: Adult Large)  Pulse 72  Ht 1.651 m (5' 5\")  Wt 79.6 kg (175 lb 6.4 oz)  BMI 29.19 kg/m2    Constitutional:  cooperative, alert and oriented, well developed, well nourished, in no acute distress        Skin:  warm and dry to the touch, no apparent skin lesions or masses noted        Head:  normocephalic, no masses or lesions        Eyes:           ENT:  no pallor or cyanosis, dentition good        Neck:  carotid pulses are full and equal bilaterally, JVP normal, no carotid bruit        Chest:  normal breath sounds, clear to auscultation, normal A-P diameter, normal symmetry, normal respiratory excursion, no use of accessory muscles        Cardiac: regular rhythm, normal S1/S2, no S3 or S4, apical impulse not displaced, no murmurs, gallops or rubs                  Abdomen:  abdomen soft;BS " normoactive        Vascular:       right radial artery;2+       right dorsalis pedis artery;2+     left radial artery;2+       left dorsalis pedis artery;2+          Extremities and Back:  no deformities, clubbing, cyanosis, erythema observed;no edema        Neurological:  no gross motor deficits          Recent Lab Results:  LIPID RESULTS:  Lab Results   Component Value Date    CHOL 195 08/15/2016    HDL 46 (L) 08/15/2016     (H) 08/15/2016    TRIG 112 08/15/2016       LIVER ENZYME RESULTS:  Lab Results   Component Value Date    AST 20 09/01/2016    ALT 22 09/01/2016       CBC RESULTS:  Lab Results   Component Value Date    WBC 6.9 03/08/2018    RBC 4.91 03/08/2018    HGB 14.2 03/08/2018    HCT 41.5 03/08/2018    MCV 85 03/08/2018    MCH 28.9 03/08/2018    MCHC 34.2 03/08/2018    RDW 13.1 03/08/2018     03/08/2018       BMP RESULTS:  Lab Results   Component Value Date     03/08/2018    POTASSIUM 3.6 03/08/2018    CHLORIDE 105 03/08/2018    CO2 26 03/08/2018    ANIONGAP 7 03/08/2018    GLC 90 03/08/2018    BUN 16 03/08/2018    CR 0.86 03/08/2018    GFRESTIMATED 69 03/08/2018    GFRESTBLACK 84 03/08/2018    KELECHI 8.7 03/08/2018        A1C RESULTS:  No results found for: A1C    INR RESULTS:  Lab Results   Component Value Date    INR 1.02 03/08/2018           CC  No referring provider defined for this encounter.                  Thank you for allowing me to participate in the care of your patient.      Sincerely,     GRAYSON [facility]    cc:   No referring provider defined for this encounter.

## 2018-04-19 NOTE — LETTER
4/19/2018    Denny Fong MD  [facility] [address]  [facility] [place] [address]    RE: Lilly Diaz       Dear Colleague,    I had the pleasure of seeing Lilly Diaz in the [facility].    HPI:  Lilly Diaz is a 53 year old female who is a patient of Dr. De La Cruz.  She returns for follow up after successful typical AVRNT ablation on 3/8/18, per Dr. De La Cruz, non-sustained AT was also induced longest AT was 6-7 sec but not targeted with ablation.    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  Successful AVNRT ablation    Today Lilly Diaz presents for feeling overall well and denies any palpitations.  Currently she is feeling a palpitation but does not go into a rapid heartbeat.  Denies chest pain or pressure, headaches, dizziness, syncope, angina, dyspnea at rest or with exertion, dry cough, palpitations, orthopnea, PND, abdominal pain, abdominal edema, pedal edema, or claudication.        Presenting EKG:   Normal sinus rhythm with an incomplete RBBB ventricular rate 69 bpm      ASSESSMENT AND PLAN    (I47.1) Paroxysmal supraventricular tachycardia s/p successful typical AVRNT ablation on 3/8/18.  Patient his recovered from procedure  Today's EKG is normal sinus rhythm  Patient denies any feelings of palpitation  Continue diltiazem 60 mg as needed  He can return to clinic as needed      Patient expresses understanding and agreement with the plan.     I appreciate the chance to help with Lilly Diaz Please let me know if you have any questions or concerns.    Tami Tovar, GRAYSON, CNP    This note was completed in part using Dragon voice recognition software. Although reviewed after completion, some word and grammatical errors may occur.    Orders Placed This Encounter   Procedures     EKG 12-lead complete w/read - Clinics (performed today)     No orders of the  defined types were placed in this encounter.    Medications Discontinued During This Encounter   Medication Reason     diltiazem (CARDIZEM) 60 MG tablet          Encounter Diagnosis   Name Primary?     Paroxysmal supraventricular tachycardia (H) Yes       CURRENT MEDICATIONS:  Current Outpatient Prescriptions   Medication Sig Dispense Refill     Alpha-Lipoic Acid 200 MG CAPS Take by mouth daily        calcium carbonate (OS-KELECHI 500 MG [place]. [place]) 1250 MG tablet Take 1 tablet by mouth 2 times daily       Cetirizine HCl (ZYRTEC ALLERGY PO) Take 10 mg by mouth daily        cholecalciferol (VITAMIN D3) 5000 UNITS CAPS capsule Take 5,000 Units by mouth daily       Cyanocobalamin (VITAMIN B-12 PO) Take 1,000 mcg by mouth daily        doxycycline, Rosacea, (ORACEA) 40 MG CPDR Take 40 mg by mouth daily       magnesium 500 MG TABS Take 1 tablet by mouth daily        Multiple Vitamins-Minerals (MULTIVITAMIN ADULT PO) Take 1 tablet by mouth daily        Omega-3 Fatty Acids (FISH OIL) 1200 MG CPDR Take 1,200 mg by mouth daily          ALLERGIES     Allergies   Allergen Reactions     Amoxicillin Rash       PAST MEDICAL HISTORY:  Past Medical History:   Diagnosis Date     Menopausal hot flushes     she takes prozac for that     Rosacea     she takes doxycycline for that     SVT (supraventricular tachycardia) (H)     SVT in 2017   - vagal manuver effective       PAST SURGICAL HISTORY:  Past Surgical History:   Procedure Laterality Date     LAPAROSCOPIC SALPINGO-OOPHORECTOMY Bilateral 2017    Procedure: LAPAROSCOPIC SALPINGO-OOPHORECTOMY;  LAPAROSCOPIC BILATERAL SALPING-OOPHORECTOMY;  Surgeon: Keesha Dwyer MD;  Location: [facility] OR     wisdom tooth removal         FAMILY HISTORY:  Family History   Problem Relation Age of Onset     Type 2 Diabetes Father      Coronary Artery Disease Mother       52     Ovarian Cancer Sister       40       SOCIAL HISTORY:  Social History     Social History     Marital status:  "     Spouse name: N/A     Number of children: N/A     Years of education: N/A     Social History Main Topics     Smoking status: Never Smoker     Smokeless tobacco: Never Used     Alcohol use 0.0 oz/week     0 Standard drinks or equivalent per week      Comment: once a week     Drug use: No     Sexual activity: Yes     Partners: Male     Birth control/ protection: Post-menopausal     Other Topics Concern     Parent/Sibling W/ Cabg, Mi Or Angioplasty Before 65f 55m? 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:  Skin:  Negative     Eyes:  Negative    ENT:  Negative    Respiratory:  Negative    Cardiovascular:  Negative;lightheadedness;dizziness;syncope or near-syncope;cyanosis;fatigue;edema;exercise intolerance Positive for  Gastroenterology: Negative    Genitourinary:  Negative    Musculoskeletal:  Negative    Neurologic:  Negative    Psychiatric:  Positive for anxiety;depression;sleep disturbances  Heme/Lymph/Imm:  Positive for night sweats;allergies  Endocrine:  Positive for night sweats    Physical Exam:  Vitals: /78 (BP Location: Left arm, Cuff Size: Adult Large)  Pulse 72  Ht 1.651 m (5' 5\")  Wt 79.6 kg (175 lb 6.4 oz)  BMI 29.19 kg/m2    Constitutional:  cooperative, alert and oriented, well developed, well nourished, in no acute distress        Skin:  warm and dry to the touch, no apparent skin lesions or masses noted        Head:  normocephalic, no masses or lesions        Eyes:           ENT:  no pallor or cyanosis, dentition good        Neck:  carotid pulses are full and equal bilaterally, JVP normal, no carotid bruit        Chest:  normal breath sounds, clear to auscultation, normal A-P diameter, normal symmetry, normal respiratory excursion, no use of accessory muscles        Cardiac: regular rhythm, normal S1/S2, no S3 or S4, apical impulse not displaced, no murmurs, gallops or rubs                  Abdomen:  abdomen soft;BS " normoactive        Vascular:       right radial artery;2+       right dorsalis pedis artery;2+     left radial artery;2+       left dorsalis pedis artery;2+          Extremities and Back:  no deformities, clubbing, cyanosis, erythema observed;no edema        Neurological:  no gross motor deficits          Recent Lab Results:  LIPID RESULTS:  Lab Results   Component Value Date    CHOL 195 08/15/2016    HDL 46 (L) 08/15/2016     (H) 08/15/2016    TRIG 112 08/15/2016       LIVER ENZYME RESULTS:  Lab Results   Component Value Date    AST 20 09/01/2016    ALT 22 09/01/2016       CBC RESULTS:  Lab Results   Component Value Date    WBC 6.9 03/08/2018    RBC 4.91 03/08/2018    HGB 14.2 03/08/2018    HCT 41.5 03/08/2018    MCV 85 03/08/2018    MCH 28.9 03/08/2018    MCHC 34.2 03/08/2018    RDW 13.1 03/08/2018     03/08/2018       BMP RESULTS:  Lab Results   Component Value Date     03/08/2018    POTASSIUM 3.6 03/08/2018    CHLORIDE 105 03/08/2018    CO2 26 03/08/2018    ANIONGAP 7 03/08/2018    GLC 90 03/08/2018    BUN 16 03/08/2018    CR 0.86 03/08/2018    GFRESTIMATED 69 03/08/2018    GFRESTBLACK 84 03/08/2018    KELECHI 8.7 03/08/2018        A1C RESULTS:  No results found for: A1C    INR RESULTS:  Lab Results   Component Value Date    INR 1.02 03/08/2018       Thank you for allowing me to participate in the care of your patient.    Sincerely,     GRAYSON [facility]

## 2018-04-19 NOTE — PROGRESS NOTES
HPI:  Lilly Diaz is a 53 year old female who is a patient of Dr. De La Cruz.  She returns for follow up after successful typical AVRNT ablation on 3/8/18, per Dr. De La Cruz, non-sustained AT was also induced longest AT was 6-7 sec but not targeted with ablation.    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  Successful AVNRT ablation    Today Lilly Diaz presents for feeling overall well and denies any palpitations.  Currently she is feeling a palpitation but does not go into a rapid heartbeat.  Denies chest pain or pressure, headaches, dizziness, syncope, angina, dyspnea at rest or with exertion, dry cough, palpitations, orthopnea, PND, abdominal pain, abdominal edema, pedal edema, or claudication.        Presenting EKG:   Normal sinus rhythm with an incomplete RBBB ventricular rate 69 bpm      ASSESSMENT AND PLAN    (I47.1) Paroxysmal supraventricular tachycardia s/p successful typical AVRNT ablation on 3/8/18.  Patient his recovered from procedure  Today's EKG is normal sinus rhythm  Patient denies any feelings of palpitation  Continue diltiazem 60 mg as needed  He can return to clinic as needed      Patient expresses understanding and agreement with the plan.     I appreciate the chance to help with Lilly Diaz Please let me know if you have any questions or concerns.    GRAYSON Liu, CNP    This note was completed in part using Dragon voice recognition software. Although reviewed after completion, some word and grammatical errors may occur.    Orders Placed This Encounter   Procedures     EKG 12-lead complete w/read - Clinics (performed today)     No orders of the defined types were placed in this encounter.    Medications Discontinued During This Encounter   Medication Reason     diltiazem (CARDIZEM) 60 MG tablet          Encounter Diagnosis   Name Primary?     Paroxysmal supraventricular tachycardia (H) Yes        CURRENT MEDICATIONS:  Current Outpatient Prescriptions   Medication Sig Dispense Refill     Alpha-Lipoic Acid 200 MG CAPS Take by mouth daily        calcium carbonate (OS-KELECHI 500 MG [place]. [place]) 1250 MG tablet Take 1 tablet by mouth 2 times daily       Cetirizine HCl (ZYRTEC ALLERGY PO) Take 10 mg by mouth daily        cholecalciferol (VITAMIN D3) 5000 UNITS CAPS capsule Take 5,000 Units by mouth daily       Cyanocobalamin (VITAMIN B-12 PO) Take 1,000 mcg by mouth daily        doxycycline, Rosacea, (ORACEA) 40 MG CPDR Take 40 mg by mouth daily       magnesium 500 MG TABS Take 1 tablet by mouth daily        Multiple Vitamins-Minerals (MULTIVITAMIN ADULT PO) Take 1 tablet by mouth daily        Omega-3 Fatty Acids (FISH OIL) 1200 MG CPDR Take 1,200 mg by mouth daily          ALLERGIES     Allergies   Allergen Reactions     Amoxicillin Rash       PAST MEDICAL HISTORY:  Past Medical History:   Diagnosis Date     Menopausal hot flushes     she takes prozac for that     Rosacea     she takes doxycycline for that     SVT (supraventricular tachycardia) (H)     SVT in 2017   - vagal manuver effective       PAST SURGICAL HISTORY:  Past Surgical History:   Procedure Laterality Date     LAPAROSCOPIC SALPINGO-OOPHORECTOMY Bilateral 2017    Procedure: LAPAROSCOPIC SALPINGO-OOPHORECTOMY;  LAPAROSCOPIC BILATERAL SALPING-OOPHORECTOMY;  Surgeon: Keesha Dwyer MD;  Location: [facility] OR     wisdom tooth removal         FAMILY HISTORY:  Family History   Problem Relation Age of Onset     Type 2 Diabetes Father      Coronary Artery Disease Mother       52     Ovarian Cancer Sister       40       SOCIAL HISTORY:  Social History     Social History     Marital status:      Spouse name: N/A     Number of children: N/A     Years of education: N/A     Social History Main Topics     Smoking status: Never Smoker     Smokeless tobacco: Never Used     Alcohol use 0.0 oz/week     0 Standard drinks or equivalent per  "week      Comment: once a week     Drug use: No     Sexual activity: Yes     Partners: Male     Birth control/ protection: Post-menopausal     Other Topics Concern     Parent/Sibling W/ Cabg, Mi Or Angioplasty Before 65f 55m? 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:  Skin:  Negative     Eyes:  Negative    ENT:  Negative    Respiratory:  Negative    Cardiovascular:  Negative;lightheadedness;dizziness;syncope or near-syncope;cyanosis;fatigue;edema;exercise intolerance Positive for  Gastroenterology: Negative    Genitourinary:  Negative    Musculoskeletal:  Negative    Neurologic:  Negative    Psychiatric:  Positive for anxiety;depression;sleep disturbances  Heme/Lymph/Imm:  Positive for night sweats;allergies  Endocrine:  Positive for night sweats    Physical Exam:  Vitals: /78 (BP Location: Left arm, Cuff Size: Adult Large)  Pulse 72  Ht 1.651 m (5' 5\")  Wt 79.6 kg (175 lb 6.4 oz)  BMI 29.19 kg/m2    Constitutional:  cooperative, alert and oriented, well developed, well nourished, in no acute distress        Skin:  warm and dry to the touch, no apparent skin lesions or masses noted        Head:  normocephalic, no masses or lesions        Eyes:           ENT:  no pallor or cyanosis, dentition good        Neck:  carotid pulses are full and equal bilaterally, JVP normal, no carotid bruit        Chest:  normal breath sounds, clear to auscultation, normal A-P diameter, normal symmetry, normal respiratory excursion, no use of accessory muscles        Cardiac: regular rhythm, normal S1/S2, no S3 or S4, apical impulse not displaced, no murmurs, gallops or rubs                  Abdomen:  abdomen soft;BS normoactive        Vascular:       right radial artery;2+       right dorsalis pedis artery;2+     left radial artery;2+       left dorsalis pedis artery;2+          Extremities and Back:  no deformities, clubbing, cyanosis, erythema observed;no edema "        Neurological:  no gross motor deficits          Recent Lab Results:  LIPID RESULTS:  Lab Results   Component Value Date    CHOL 195 08/15/2016    HDL 46 (L) 08/15/2016     (H) 08/15/2016    TRIG 112 08/15/2016       LIVER ENZYME RESULTS:  Lab Results   Component Value Date    AST 20 09/01/2016    ALT 22 09/01/2016       CBC RESULTS:  Lab Results   Component Value Date    WBC 6.9 03/08/2018    RBC 4.91 03/08/2018    HGB 14.2 03/08/2018    HCT 41.5 03/08/2018    MCV 85 03/08/2018    MCH 28.9 03/08/2018    MCHC 34.2 03/08/2018    RDW 13.1 03/08/2018     03/08/2018       BMP RESULTS:  Lab Results   Component Value Date     03/08/2018    POTASSIUM 3.6 03/08/2018    CHLORIDE 105 03/08/2018    CO2 26 03/08/2018    ANIONGAP 7 03/08/2018    GLC 90 03/08/2018    BUN 16 03/08/2018    CR 0.86 03/08/2018    GFRESTIMATED 69 03/08/2018    GFRESTBLACK 84 03/08/2018    KELECHI 8.7 03/08/2018        A1C RESULTS:  No results found for: A1C    INR RESULTS:  Lab Results   Component Value Date    INR 1.02 03/08/2018           CC  No referring provider defined for this encounter.

## 2018-04-19 NOTE — PATIENT INSTRUCTIONS
Call us if you have problems.        You will receive all normal lab and testing results via [facility] or mail if not reviewed in clinic today. Please contact our office if you need assistance with setting up MyChart.    If you need a medication refill please contact your pharmacy. Please allow 3 business days for your refill to be completed.     As always, thank you for trusting us with your health care needs!    If you have any questions regarding your visit please contact your care team:   Cardiology  Telephone Number    [facility] RNs  Lore Sainz, and Nancy [phone]     Call for Electrophysiology procedure scheduling concerns  Tenisha Chaudhry [phone]   Device Clinic (Pacemakers, ICDs, Loop)   RN's :      Rika Lagos Eva, DESIREE Gibson, Livier Avelar During business hours:   [phone]

## 2018-06-21 ENCOUNTER — TRANSFERRED RECORDS (OUTPATIENT)
Dept: HEALTH INFORMATION MANAGEMENT | Facility: CLINIC | Age: 53
End: 2018-06-21

## 2018-06-21 LAB
HPV ABSTRACT: NORMAL
PAP-ABSTRACT: NORMAL

## 2018-08-14 ENCOUNTER — TRANSFERRED RECORDS (OUTPATIENT)
Dept: HEALTH INFORMATION MANAGEMENT | Facility: CLINIC | Age: 53
End: 2018-08-14

## 2018-12-06 ENCOUNTER — ALLIED HEALTH/NURSE VISIT (OUTPATIENT)
Dept: NURSING | Facility: CLINIC | Age: 53
End: 2018-12-06
Payer: COMMERCIAL

## 2018-12-06 DIAGNOSIS — Z23 NEED FOR PROPHYLACTIC VACCINATION AND INOCULATION AGAINST INFLUENZA: Primary | ICD-10-CM

## 2018-12-06 PROCEDURE — 90471 IMMUNIZATION ADMIN: CPT

## 2018-12-06 PROCEDURE — 99207 ZZC NO CHARGE NURSE ONLY: CPT

## 2018-12-06 PROCEDURE — 90686 IIV4 VACC NO PRSV 0.5 ML IM: CPT

## 2018-12-06 NOTE — PROGRESS NOTES

## 2018-12-06 NOTE — MR AVS SNAPSHOT
After Visit Summary   12/6/2018    Lilly Diaz    MRN: [id number]           Patient Information     Date Of Birth          1965        Visit Information        Provider Department      12/6/2018 8:30 AM [facility] NURSE [facility] Ad[facility]berto Coley        Today's Diagnoses     Need for prophylactic vaccination and inoculation against influenza    -  1       Follow-ups after your visit        Who to contact     If you have questions or need follow up information about today's clinic visit or your schedule please contact [facility] directly at [phone].  Normal or non-critical lab and imaging results will be communicated to you by Lingua.lyhart, letter or phone within 4 business days after the clinic has received the results. If you do not hear from us within 7 days, please contact the clinic through FirstBestt or phone. If you have a critical or abnormal lab result, we will notify you by phone as soon as possible.  Submit refill requests through Axceler or call your pharmacy and they will forward the refill request to us. Please allow 3 business days for your refill to be completed.          Additional Information About Your Visit        MyChart Information     Axceler gives you secure access to your electronic health record. If you see a primary care provider, you can also send messages to your care team and make appointments. If you have questions, please call your primary care clinic.  If you do not have a primary care provider, please call [phone] and they will assist you.        Care EveryWhere ID     This is your Care EveryWhere ID. This could be used by other organizations to access your [facility] medical records  [id number]         Blood Pressure from Last 3 Encounters:   04/19/18 124/78   03/08/18 122/67   02/15/18 128/76    Weight from Last 3 Encounters:   04/19/18 175 lb 6.4 oz (79.6 kg)   03/08/18 171 lb (77.6 kg)   07/11/17 174 lb (78.9 kg)              We Performed the  Following     FLU VACCINE, SPLIT VIRUS, IM (QUADRIVALENT) [56514]- >3 YRS     Vaccine Administration, Initial [46922]        Primary Care Provider Office Phone # Fax #    Denny Fong [phone] [address] [place] [address] [place] [address]        Equal Access to Services     [facility] : Hadii aad ku hadasho Soomaali, waaxda luqadaha, qaybta kaalmada adeegyada, waxay idiin hayaan adeeg kharash laMickyaan . So [facility] [phone].    ATENCIÓN: Si habla español, tiene a castelan disposición servicios gratuitos de asistencia lingüística. Llame al [phone].    We comply with applicable federal civil rights laws and [place] laws. We do not discriminate on the basis of race, color, national origin, age, disability, sex, sexual orientation, or gender identity.            Thank you!     Thank you for choosing [facility]  for your care. Our goal is always to provide you with excellent care. Hearing back from our patients is one way we can continue to improve our services. Please take a few minutes to complete the written survey that you may receive in the mail after your visit with us. Thank you!             Your Updated Medication List - Protect others around you: Learn how to safely use, store and throw away your medicines at www.disposemymeds.org.          This list is accurate as of 12/6/18  8:34 AM.  Always use your most recent med list.                   Brand Name Dispense Instructions for use Diagnosis    Alpha-Lipoic Acid 200 MG Caps      Take by mouth daily        calcium carbonate 500 MG tablet    OS-KELECHI     Take 1 tablet by mouth 2 times daily        cholecalciferol 5000 units (125 mcg) capsule    VITAMIN D3     Take 5,000 Units by mouth daily        doxycycline ROSACEA 40 MG DR capsule    ORACEA     Take 40 mg by mouth daily        Fish Oil 1200 MG Cpdr      Take 1,200 mg by mouth daily        magnesium 500 MG Tabs      Take 1 tablet by mouth daily        MULTIVITAMIN ADULT PO       Take 1 tablet by mouth daily        VITAMIN B-12 PO      Take 1,000 mcg by mouth daily        ZYRTEC ALLERGY PO      Take 10 mg by mouth daily

## 2018-12-27 ENCOUNTER — TRANSFERRED RECORDS (OUTPATIENT)
Dept: HEALTH INFORMATION MANAGEMENT | Facility: CLINIC | Age: 53
End: 2018-12-27

## 2019-05-03 ENCOUNTER — HEALTH MAINTENANCE LETTER (OUTPATIENT)
Age: 54
End: 2019-05-03

## 2019-05-30 ENCOUNTER — TRANSFERRED RECORDS (OUTPATIENT)
Dept: HEALTH INFORMATION MANAGEMENT | Facility: CLINIC | Age: 54
End: 2019-05-30

## 2019-06-21 ENCOUNTER — TRANSFERRED RECORDS (OUTPATIENT)
Dept: HEALTH INFORMATION MANAGEMENT | Facility: CLINIC | Age: 54
End: 2019-06-21

## 2019-06-21 LAB
HPV ABSTRACT: NORMAL
PAP-ABSTRACT: NORMAL

## 2019-07-18 ENCOUNTER — OFFICE VISIT (OUTPATIENT)
Dept: FAMILY MEDICINE | Facility: CLINIC | Age: 54
End: 2019-07-18
Payer: COMMERCIAL

## 2019-07-18 VITALS
DIASTOLIC BLOOD PRESSURE: 72 MMHG | WEIGHT: 178.6 LBS | BODY MASS INDEX: 28.7 KG/M2 | OXYGEN SATURATION: 98 % | HEIGHT: 66 IN | HEART RATE: 76 BPM | SYSTOLIC BLOOD PRESSURE: 124 MMHG | TEMPERATURE: 98.6 F

## 2019-07-18 DIAGNOSIS — Z11.4 SCREENING FOR HIV (HUMAN IMMUNODEFICIENCY VIRUS): ICD-10-CM

## 2019-07-18 DIAGNOSIS — Z13.220 SCREENING FOR LIPOID DISORDERS: ICD-10-CM

## 2019-07-18 DIAGNOSIS — I47.10 PAROXYSMAL SUPRAVENTRICULAR TACHYCARDIA (H): ICD-10-CM

## 2019-07-18 DIAGNOSIS — J03.90 TONSILLITIS: ICD-10-CM

## 2019-07-18 DIAGNOSIS — Z01.818 PREOP GENERAL PHYSICAL EXAM: Primary | ICD-10-CM

## 2019-07-18 LAB
ERYTHROCYTE [DISTWIDTH] IN BLOOD BY AUTOMATED COUNT: 13.6 % (ref 10–15)
HCT VFR BLD AUTO: 43.1 % (ref 35–47)
HGB BLD-MCNC: 15 G/DL (ref 11.7–15.7)
MCH RBC QN AUTO: 29.9 PG (ref 26.5–33)
MCHC RBC AUTO-ENTMCNC: 34.8 G/DL (ref 31.5–36.5)
MCV RBC AUTO: 86 FL (ref 78–100)
PLATELET # BLD AUTO: 229 10E9/L (ref 150–450)
RBC # BLD AUTO: 5.02 10E12/L (ref 3.8–5.2)
WBC # BLD AUTO: 5.9 10E9/L (ref 4–11)

## 2019-07-18 PROCEDURE — 85027 COMPLETE CBC AUTOMATED: CPT | Performed by: INTERNAL MEDICINE

## 2019-07-18 PROCEDURE — 36415 COLL VENOUS BLD VENIPUNCTURE: CPT | Performed by: INTERNAL MEDICINE

## 2019-07-18 PROCEDURE — 99214 OFFICE O/P EST MOD 30 MIN: CPT | Performed by: INTERNAL MEDICINE

## 2019-07-18 PROCEDURE — 80053 COMPREHEN METABOLIC PANEL: CPT | Performed by: INTERNAL MEDICINE

## 2019-07-18 PROCEDURE — 93000 ELECTROCARDIOGRAM COMPLETE: CPT | Performed by: INTERNAL MEDICINE

## 2019-07-18 PROCEDURE — 87389 HIV-1 AG W/HIV-1&-2 AB AG IA: CPT | Performed by: INTERNAL MEDICINE

## 2019-07-18 PROCEDURE — 80061 LIPID PANEL: CPT | Performed by: INTERNAL MEDICINE

## 2019-07-18 RX ORDER — ESCITALOPRAM OXALATE 5 MG/1
1 TABLET ORAL EVERY MORNING
COMMUNITY
Start: 2019-07-04

## 2019-07-18 ASSESSMENT — MIFFLIN-ST. JEOR: SCORE: 1421.87

## 2019-07-18 NOTE — PATIENT INSTRUCTIONS
"  Before Your Surgery      Call your surgeon if there is any change in your health. This includes signs of a cold or flu (such as a sore throat, runny nose, cough, rash or fever).    Do not smoke, drink alcohol or take over the counter medicine (unless your surgeon or primary care doctor tells you to) for the 24 hours before and after surgery.    If you take prescribed drugs: Follow your doctor s orders about which medicines to take and which to stop until after surgery.    Eating and drinking prior to surgery: follow the instructions from your surgeon    Take a shower or bath the night before surgery. Use the soap your surgeon gave you to gently clean your skin. If you do not have soap from your surgeon, use your regular soap. Do not shave or scrub the surgery site.  Wear clean pajamas and have clean sheets on your bed.     You should get the new shingles vaccine series \"SHINGRIX\" (not Zostavax) at a pharmacy.    "

## 2019-07-18 NOTE — LETTER
"[facility]  [address]  [address]  [place]  [address]  Tel: [phone]    July 22, 2019    Lilly Diaz  [address] [place] [address]        Dear Ms. Emily,    The following letter pertains to your most recent diagnostic tests:    -Your HIV test is negative.     -Your total cholesterol is 195 which is at your goal of total cholesterol less than 200.    -Your triglycerides are 103 which are at your goal of triglycerides less than 150.    -Your HDL or \"good cholesterol\" is 47 which is below your goal of HDL cholesterol greater than 50.    -Your LDL cholesterol or \"bad cholesterol\" is 127 which is at your goal of LDL cholesterol less than <160.  Your LDL goal is based on your risk factors for artery disease.     -Liver and gallbladder tests are normal for you. (ALT,AST, Alk phos, bilirubin), kidney function is normal for you (Creatinine, GFR), Sodium is normal, Potassium is normal for you, Calcium is normal for you, Glucose (blood sugar) is normal for you.      -Your complete blood counts including your hemoglobin returned normal for you.         Bottom line:  Lab results look OK.  Increasing exercise can improve HDL (\"good cholesterol\") levels.  A good target to shoot for is 30 minutes of aerobic activity at least 4 days per week.       Follow up:  Schedule an appointment for a physical examination with fasting blood tests in one year's time, or return sooner if new questions, symptoms or problems arise.       Sincerely,    Dr. Fong/BINH      The 10-year ASCVD risk score (Belleair Beachchi OCONNELL Jr., et al., 2013) is: 2.1%    Values used to calculate the score:      Age: 54 years      Sex: Female      Is Non- : No      Diabetic: No      Tobacco smoker: No      Systolic Blood Pressure: 124 mmHg      Is BP treated: No      HDL Cholesterol: 47 mg/dL      Total Cholesterol: 195 mg/dL      Enclosure: Lab Results  Results for orders placed or performed in visit on " 07/18/19   HIV Antigen Antibody Combo   Result Value Ref Range    HIV Antigen Antibody Combo Nonreactive NR^Nonreactive       CBC with platelets   Result Value Ref Range    WBC 5.9 4.0 - 11.0 10e9/L    RBC Count 5.02 3.8 - 5.2 10e12/L    Hemoglobin 15.0 11.7 - 15.7 g/dL    Hematocrit 43.1 35.0 - 47.0 %    MCV 86 78 - 100 fl    MCH 29.9 26.5 - 33.0 pg    MCHC 34.8 31.5 - 36.5 g/dL    RDW 13.6 10.0 - 15.0 %    Platelet Count 229 150 - 450 10e9/L   Comprehensive metabolic panel   Result Value Ref Range    Sodium 140 133 - 144 mmol/L    Potassium 4.3 3.4 - 5.3 mmol/L    Chloride 110 (H) 94 - 109 mmol/L    Carbon Dioxide 22 20 - 32 mmol/L    Anion Gap 8 3 - 14 mmol/L    Glucose 82 70 - 99 mg/dL    Urea Nitrogen 17 7 - 30 mg/dL    Creatinine 0.88 0.52 - 1.04 mg/dL    GFR Estimate 74 >60 mL/min/[1.73_m2]    GFR Estimate If Black 85 >60 mL/min/[1.73_m2]    Calcium 9.2 8.5 - 10.1 mg/dL    Bilirubin Total 0.8 0.2 - 1.3 mg/dL    Albumin 4.0 3.4 - 5.0 g/dL    Protein Total 7.4 6.8 - 8.8 g/dL    Alkaline Phosphatase 101 40 - 150 U/L    ALT 20 0 - 50 U/L    AST 21 0 - 45 U/L   Lipid panel reflex to direct LDL Non-fasting   Result Value Ref Range    Cholesterol 195 <200 mg/dL    Triglycerides 103 <150 mg/dL    HDL Cholesterol 47 (L) >49 mg/dL    LDL Cholesterol Calculated 127 (H) <100 mg/dL    Non HDL Cholesterol 148 (H) <130 mg/dL

## 2019-07-18 NOTE — PROGRESS NOTES
[place] [address] [facility] [address]  [phone]  Dept: [phone]    PRE-OP EVALUATION:  Today's date: 2019    Lilly Diaz (: 1965) presents for pre-operative evaluation assessment as requested by Dr. Lawrence.  She requires evaluation and anesthesia risk assessment prior to undergoing surgery/procedure for treatment of recurrent infections and cyst on left tonsil .    Fax number for surgical facility: [phone]  Primary Physician: Denny Fong  Type of Anesthesia Anticipated: General    Patient has a Health Care Directive or Living Will:  YES     Preop Questions 7/15/2019   Who is doing your surgery? Julio Cesar Lawrence   What are you having done? Tonsillectomy   Date of Surgery/Procedure: 2019   Facility or Hospital where procedure/surgery will be performed: [facility]   1.  Do you have a history of Heart attack, stroke, stent, coronary bypass surgery, or other heart surgery? YES  - SVT ablation 3/2018; stable symptoms since then    2.  Do you ever have any pain or discomfort in your chest? No   3.  Do you have a history of  Heart Failure? No   4.   Are you troubled by shortness of breath when:  walking on a level surface, or up a slight hill, or at night? No   5.  Do you currently have a cold, bronchitis or other respiratory infection? No   6.  Do you have a cough, shortness of breath, or wheezing? No   7.  Do you sometimes get pains in the calves of your legs when you walk? No   8. Do you or anyone in your family have previous history of blood clots? No   9.  Do you or does anyone in your family have a serious bleeding problem such as prolonged bleeding following surgeries or cuts? No   10. Have you ever had problems with anemia or been told to take iron pills? No   11. Have you had any abnormal blood loss such as black, tarry or bloody stools, or abnormal vaginal bleeding? No   12. Have you ever had a blood transfusion?  No   13. Have you or any of your relatives ever had problems with anesthesia? YES - problems waking from anesthesia after oophorectomy many years ago    14. Do you have sleep apnea, excessive snoring or daytime drowsiness? No   15. Do you have any prosthetic heart valves? No   16. Do you have prosthetic joints? No   17. Is there any chance that you may be pregnant? 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   She can easily perform 4 METS of physical activity without chest pain or dyspnea or palpitations.         MEDICAL HISTORY:     Patient Active Problem List    Diagnosis Date Noted     ACP (advance care planning) 12/22/2017     Priority: Medium     Symptomatic menopausal or female climacteric states 07/03/2017     Priority: Medium     Paroxysmal supraventricular tachycardia (H) 04/27/2017     Priority: Medium     Osteopenia 09/01/2016     Priority: Medium     Hypercalcemia 09/01/2016     Priority: Medium      Past Medical History:   Diagnosis Date     Menopausal hot flushes     she takes prozac for that     Rosacea     she takes doxycycline for that     SVT (supraventricular tachycardia) (H)     SVT in April 2017   - vagal manuver effective     Past Surgical History:   Procedure Laterality Date     LAPAROSCOPIC SALPINGO-OOPHORECTOMY Bilateral 7/11/2017    Procedure: LAPAROSCOPIC SALPINGO-OOPHORECTOMY;  LAPAROSCOPIC BILATERAL SALPING-OOPHORECTOMY;  Surgeon: Keesha Dwyer MD;  Location: [facility] OR     wisdom tooth removal       Current Outpatient Medications   Medication Sig Dispense Refill     calcium carbonate (OS-KELECHI 500 MG [place]. [place]) 1250 MG tablet Take 1 tablet by mouth 2 times daily       Cetirizine HCl (ZYRTEC ALLERGY PO) Take 10 mg by mouth daily        cholecalciferol (VITAMIN D3) 5000 UNITS CAPS capsule Take 5,000 Units by mouth daily        "doxycycline, Rosacea, (ORACEA) 40 MG CPDR Take 40 mg by mouth daily       escitalopram (LEXAPRO) 5 MG tablet Take 1 tablet by mouth every morning       magnesium 500 MG TABS Take 1 tablet by mouth daily        Alpha-Lipoic Acid 200 MG CAPS Take by mouth daily        Cyanocobalamin (VITAMIN B-12 PO) Take 1,000 mcg by mouth daily        Multiple Vitamins-Minerals (MULTIVITAMIN ADULT PO) Take 1 tablet by mouth daily        Omega-3 Fatty Acids (FISH OIL) 1200 MG CPDR Take 1,200 mg by mouth daily            Allergies   Allergen Reactions     Amoxicillin Rash        Social History     Tobacco Use     Smoking status: Never Smoker     Smokeless tobacco: Never Used   Substance Use Topics     Alcohol use: Yes     Alcohol/week: 0.0 oz     Comment: once a week     History   Drug Use No       REVIEW OF SYSTEMS:   Constitutional, neuro, ENT, endocrine, pulmonary, cardiac, gastrointestinal, genitourinary, musculoskeletal, integument and psychiatric systems are negative, except as otherwise noted.    EXAM:   /72 (BP Location: Right arm, Patient Position: Sitting, Cuff Size: Adult Regular)   Pulse 76   Temp 98.6  F (37  C) (Oral)   Ht 1.676 m (5' 6\")   Wt 81 kg (178 lb 9.6 oz)   SpO2 98%   BMI 28.83 kg/m      GENERAL APPEARANCE: healthy, alert and no distress     EYES: EOMI, PERRL     HENT: ear canals and TM's normal and nose and mouth without ulcers or lesions     NECK: no adenopathy, no asymmetry, masses, or scars and thyroid normal to palpation     RESP: lungs clear to auscultation - no rales, rhonchi or wheezes     CV: regular rates and rhythm, normal S1 S2, no S3 or S4 and no murmur, click or rub     ABDOMEN:  soft, nontender, no HSM or masses and bowel sounds normal     MS: extremities normal- no gross deformities noted, no evidence of inflammation in joints, FROM in all extremities.     SKIN: no suspicious lesions or rashes     NEURO: Normal strength and tone, sensory exam grossly normal, mentation intact and " speech normal     PSYCH: mentation appears normal. and affect normal/bright     LYMPHATICS: No cervical adenopathy    DIAGNOSTICS:   EKG: sinus rhythm no ST changes     Recent Labs   Lab Test 03/08/18  1133 07/03/17  1038   HGB 14.2 14.3    243   INR 1.02  --     139   POTASSIUM 3.6 4.4   CR 0.86 0.93        IMPRESSION:   Reason for surgery/procedure: recurrent tonsillitis   Diagnosis/reason for consult: Preoperative evaluation     The proposed surgical procedure is considered LOW risk.    REVISED CARDIAC RISK INDEX  The patient has the following serious cardiovascular risks for perioperative complications such as (MI, PE, VFib and 3  AV Block):  No serious cardiac risks  INTERPRETATION: 0 risks: Class I (very low risk - 0.4% complication rate)    The patient has the following additional risks for perioperative complications:  No identified additional risks      ICD-10-CM    1. Preop general physical exam Z01.818 CBC with platelets     Comprehensive metabolic panel     EKG 12-lead complete w/read - Clinics   2. Tonsillitis J03.90    3. Paroxysmal supraventricular tachycardia (H) I47.1    4. Screening for lipoid disorders Z13.220 Lipid panel reflex to direct LDL Non-fasting   5. Screening for HIV (human immunodeficiency virus) Z11.4 HIV Antigen Antibody Combo       RECOMMENDATIONS:         --Patient is to take all scheduled medications on the day of surgery EXCEPT for modifications listed below.    APPROVAL GIVEN to proceed with proposed procedure, without further diagnostic evaluation       Signed Electronically by: Denny Fong MD    Copy of this evaluation report is provided to requesting physician.    [facility] Preop Guidelines    Revised Cardiac Risk Index

## 2019-07-19 LAB
ALBUMIN SERPL-MCNC: 4 G/DL (ref 3.4–5)
ALP SERPL-CCNC: 101 U/L (ref 40–150)
ALT SERPL W P-5'-P-CCNC: 20 U/L (ref 0–50)
ANION GAP SERPL CALCULATED.3IONS-SCNC: 8 MMOL/L (ref 3–14)
AST SERPL W P-5'-P-CCNC: 21 U/L (ref 0–45)
BILIRUB SERPL-MCNC: 0.8 MG/DL (ref 0.2–1.3)
BUN SERPL-MCNC: 17 MG/DL (ref 7–30)
CALCIUM SERPL-MCNC: 9.2 MG/DL (ref 8.5–10.1)
CHLORIDE SERPL-SCNC: 110 MMOL/L (ref 94–109)
CHOLEST SERPL-MCNC: 195 MG/DL
CO2 SERPL-SCNC: 22 MMOL/L (ref 20–32)
CREAT SERPL-MCNC: 0.88 MG/DL (ref 0.52–1.04)
GFR SERPL CREATININE-BSD FRML MDRD: 74 ML/MIN/{1.73_M2}
GLUCOSE SERPL-MCNC: 82 MG/DL (ref 70–99)
HDLC SERPL-MCNC: 47 MG/DL
HIV 1+2 AB+HIV1 P24 AG SERPL QL IA: NONREACTIVE
LDLC SERPL CALC-MCNC: 127 MG/DL
NONHDLC SERPL-MCNC: 148 MG/DL
POTASSIUM SERPL-SCNC: 4.3 MMOL/L (ref 3.4–5.3)
PROT SERPL-MCNC: 7.4 G/DL (ref 6.8–8.8)
SODIUM SERPL-SCNC: 140 MMOL/L (ref 133–144)
TRIGL SERPL-MCNC: 103 MG/DL

## 2019-07-20 NOTE — RESULT ENCOUNTER NOTE
"The following letter pertains to your most recent diagnostic tests:    -Your HIV test is negative.     -Your total cholesterol is 195 which is at your goal of total cholesterol less than 200.    -Your triglycerides are 103 which are at your goal of triglycerides less than 150.    -Your HDL or \"good cholesterol\" is 47 which is below your goal of HDL cholesterol greater than 50.    -Your LDL cholesterol or \"bad cholesterol\" is 127 which is at your goal of LDL cholesterol less than <160.  Your LDL goal is based on your risk factors for artery disease.     -Liver and gallbladder tests are normal for you. (ALT,AST, Alk phos, bilirubin), kidney function is normal for you (Creatinine, GFR), Sodium is normal, Potassium is normal for you, Calcium is normal for you, Glucose (blood sugar) is normal for you.      -Your complete blood counts including your hemoglobin returned normal for you.         Bottom line:  Lab results look OK.  Increasing exercise can improve HDL (\"good cholesterol\") levels.  A good target to shoot for is 30 minutes of aerobic activity at least 4 days per week.       Follow up:  Schedule an appointment for a physical examination with fasting blood tests in one year's time, or return sooner if new questions, symptoms or problems arise.       Sincerely,    Dr. Fong      The 10-year ASCVD risk score (Bernicechi OCONNELL Jr., et al., 2013) is: 2.1%    Values used to calculate the score:      Age: 54 years      Sex: Female      Is Non- : No      Diabetic: No      Tobacco smoker: No      Systolic Blood Pressure: 124 mmHg      Is BP treated: No      HDL Cholesterol: 47 mg/dL      Total Cholesterol: 195 mg/dL"

## 2019-08-30 ENCOUNTER — TRANSFERRED RECORDS (OUTPATIENT)
Dept: HEALTH INFORMATION MANAGEMENT | Facility: CLINIC | Age: 54
End: 2019-08-30

## 2019-09-30 ENCOUNTER — HEALTH MAINTENANCE LETTER (OUTPATIENT)
Age: 54
End: 2019-09-30

## 2020-11-11 ENCOUNTER — TRANSFERRED RECORDS (OUTPATIENT)
Dept: HEALTH INFORMATION MANAGEMENT | Facility: CLINIC | Age: 55
End: 2020-11-11

## 2020-11-30 ENCOUNTER — MYC MEDICAL ADVICE (OUTPATIENT)
Dept: FAMILY MEDICINE | Facility: CLINIC | Age: 55
End: 2020-11-30

## 2021-01-15 ENCOUNTER — HEALTH MAINTENANCE LETTER (OUTPATIENT)
Age: 56
End: 2021-01-15

## 2021-03-20 ENCOUNTER — HEALTH MAINTENANCE LETTER (OUTPATIENT)
Age: 56
End: 2021-03-20

## 2021-10-24 ENCOUNTER — HEALTH MAINTENANCE LETTER (OUTPATIENT)
Age: 56
End: 2021-10-24

## 2021-12-09 ENCOUNTER — ALLIED HEALTH/NURSE VISIT (OUTPATIENT)
Dept: FAMILY MEDICINE | Facility: CLINIC | Age: 56
End: 2021-12-09
Payer: COMMERCIAL

## 2021-12-09 DIAGNOSIS — Z23 NEED FOR PROPHYLACTIC VACCINATION AND INOCULATION AGAINST INFLUENZA: Primary | ICD-10-CM

## 2021-12-09 PROCEDURE — 90682 RIV4 VACC RECOMBINANT DNA IM: CPT

## 2021-12-09 PROCEDURE — 99207 PR NO CHARGE NURSE ONLY: CPT

## 2021-12-09 PROCEDURE — 90471 IMMUNIZATION ADMIN: CPT

## 2022-01-15 ENCOUNTER — TRANSFERRED RECORDS (OUTPATIENT)
Dept: HEALTH INFORMATION MANAGEMENT | Facility: CLINIC | Age: 57
End: 2022-01-15

## 2022-02-13 ENCOUNTER — HEALTH MAINTENANCE LETTER (OUTPATIENT)
Age: 57
End: 2022-02-13

## 2022-08-09 ENCOUNTER — VIRTUAL VISIT (OUTPATIENT)
Dept: FAMILY MEDICINE | Facility: CLINIC | Age: 57
End: 2022-08-09
Payer: COMMERCIAL

## 2022-08-09 DIAGNOSIS — Z00.00 ROUTINE GENERAL MEDICAL EXAMINATION AT A HEALTH CARE FACILITY: ICD-10-CM

## 2022-08-09 PROCEDURE — 99386 PREV VISIT NEW AGE 40-64: CPT | Mod: 95 | Performed by: INTERNAL MEDICINE

## 2022-08-09 RX ORDER — DIMENHYDRINATE 50 MG
1 TABLET ORAL DAILY
COMMUNITY

## 2022-08-09 RX ORDER — DOXYCYCLINE 50 MG/1
50 TABLET ORAL
COMMUNITY

## 2022-08-09 NOTE — PROGRESS NOTES
Lilly is a 57 year old who is being evaluated via a billable video visit.      How would you like to obtain your AVS? Wonga  If the video visit is dropped, the invitation should be resent by: Text to cell phone: [employer] video   Will anyone else be joining your video visit? No        Assessment & Plan     Routine general medical examination at a health care facility  Check screening labs  Decision re statin for primary prevention pending ascvd risk score  Counseled on diet and exercise interventions to promote weight loss   - Lipid panel reflex to direct LDL Fasting; Future  - Comprehensive metabolic panel (BMP + Alb, Alk Phos, ALT, AST, Total. Bili, TP); Future  - CBC with platelets; Future      32 minutes spent on the date of the encounter doing chart review, history and exam, documentation and further activities per the note           Return in about 1 week (around 2022) for Non-fasting Lab Only Visit.   Patient instructed to return to clinic or contact us sooner if symptoms worsen or new symptoms develop.   Denny Fong MD  [employer] [employer]    Gerry Carr is a 57 year old, presenting for the following health issues:  No chief complaint on file.      History of Present Illness       Reason for visit:  Weight Gain since menopause and the beginning of the pandemic.  I want to check on my cholesterol and blood sugar and heart health because of family history.  (Mom had heart disease and  at 52.  Dad has type two diabetes and high cholesterol .)  Symptom onset:  More than a month  Symptoms include:  I weigh the most I've ever weighed.  At times my heart jumps as if trying to start SVT again.  The jumping seems tied to dehydration and my heart doesn't go into SVT.  I'm short of breath when exercising but I'm also lugging extra weight.  Symptom intensity:  Mild  Symptom progression:  Improving  Had these symptoms before:  Yes  Has tried/received treatment for these symptoms:  No  What  makes it worse:  Overeating  What makes it better:  I'm slowly beginning to move my body again.    She eats 2-3 servings of fruits and vegetables daily.She consumes 1 sweetened beverage(s) daily.She exercises with enough effort to increase her heart rate 10 to 19 minutes per day.  She exercises with enough effort to increase her heart rate 3 or less days per week.   She is taking medications regularly.       She has gained weight  Family history of diabetes and CAD onset age 50's  Therefore she would like screening labs  Home blood pressure readings have been in 100/60's  No exertional chest pain or unusual dyspnea   She intends to change diet and increase physical activity  She had a mammogram in January       Review of Systems         Objective           Vitals:  No vitals were obtained today due to virtual visit.    Physical Exam   GENERAL: Healthy, alert and no distress  EYES: Eyes grossly normal to inspection.  No discharge or erythema, or obvious scleral/conjunctival abnormalities.  RESP: No audible wheeze, cough, or visible cyanosis.  No visible retractions or increased work of breathing.    SKIN: Visible skin clear. No significant rash, abnormal pigmentation or lesions.  NEURO: Cranial nerves grossly intact.  Mentation and speech appropriate for age.  PSYCH: Mentation appears normal, affect normal/bright, judgement and insight intact, normal speech and appearance well-groomed.                Video-Visit Details    Video Start Time: 10:06 AM    Type of service:  Video Visit    Video End Time:10:31 AM    Originating Location (pt. Location): Home    Distant Location (provider location):  [facility]     Platform used for Video Visit: CELtrak  Crystal

## 2022-08-11 ENCOUNTER — LAB (OUTPATIENT)
Dept: LAB | Facility: CLINIC | Age: 57
End: 2022-08-11
Payer: COMMERCIAL

## 2022-08-11 DIAGNOSIS — Z00.00 ROUTINE GENERAL MEDICAL EXAMINATION AT A HEALTH CARE FACILITY: ICD-10-CM

## 2022-08-11 LAB
ERYTHROCYTE [DISTWIDTH] IN BLOOD BY AUTOMATED COUNT: 12.7 % (ref 10–15)
HCT VFR BLD AUTO: 42 % (ref 35–47)
HGB BLD-MCNC: 14.1 G/DL (ref 11.7–15.7)
MCH RBC QN AUTO: 29.3 PG (ref 26.5–33)
MCHC RBC AUTO-ENTMCNC: 33.6 G/DL (ref 31.5–36.5)
MCV RBC AUTO: 87 FL (ref 78–100)
PLATELET # BLD AUTO: 216 10E3/UL (ref 150–450)
RBC # BLD AUTO: 4.82 10E6/UL (ref 3.8–5.2)
WBC # BLD AUTO: 5.6 10E3/UL (ref 4–11)

## 2022-08-11 PROCEDURE — 85027 COMPLETE CBC AUTOMATED: CPT

## 2022-08-11 PROCEDURE — 36415 COLL VENOUS BLD VENIPUNCTURE: CPT

## 2022-08-11 PROCEDURE — 80061 LIPID PANEL: CPT

## 2022-08-11 PROCEDURE — 80053 COMPREHEN METABOLIC PANEL: CPT

## 2022-08-12 LAB
ALBUMIN SERPL-MCNC: 3.9 G/DL (ref 3.4–5)
ALP SERPL-CCNC: 92 U/L (ref 40–150)
ALT SERPL W P-5'-P-CCNC: 23 U/L (ref 0–50)
ANION GAP SERPL CALCULATED.3IONS-SCNC: 5 MMOL/L (ref 3–14)
AST SERPL W P-5'-P-CCNC: 24 U/L (ref 0–45)
BILIRUB SERPL-MCNC: 0.7 MG/DL (ref 0.2–1.3)
BUN SERPL-MCNC: 20 MG/DL (ref 7–30)
CALCIUM SERPL-MCNC: 9.1 MG/DL (ref 8.5–10.1)
CHLORIDE BLD-SCNC: 105 MMOL/L (ref 94–109)
CHOLEST SERPL-MCNC: 179 MG/DL
CO2 SERPL-SCNC: 26 MMOL/L (ref 20–32)
CREAT SERPL-MCNC: 0.95 MG/DL (ref 0.52–1.04)
FASTING STATUS PATIENT QL REPORTED: YES
GFR SERPL CREATININE-BSD FRML MDRD: 69 ML/MIN/1.73M2
GLUCOSE BLD-MCNC: 91 MG/DL (ref 70–99)
HDLC SERPL-MCNC: 43 MG/DL
LDLC SERPL CALC-MCNC: 115 MG/DL
NONHDLC SERPL-MCNC: 136 MG/DL
POTASSIUM BLD-SCNC: 4.1 MMOL/L (ref 3.4–5.3)
PROT SERPL-MCNC: 7.1 G/DL (ref 6.8–8.8)
SODIUM SERPL-SCNC: 136 MMOL/L (ref 133–144)
TRIGL SERPL-MCNC: 103 MG/DL

## 2022-08-12 NOTE — RESULT ENCOUNTER NOTE
"The following letter pertains to your most recent diagnostic tests:    -Your total cholesterol is 179 which is at your goal of total cholesterol less than 200.    -Your triglycerides are 103 which are at your goal of triglycerides less than 150.    -Your HDL or \"good cholesterol\" is 43 which is below your goal of HDL cholesterol greater than 50.    -Your LDL cholesterol or \"bad cholesterol\" is  which is at your goal of LDL cholesterol less than <160.  Your LDL goal is based on your risk factors for artery disease.  Your LDL has improved from 127 last check.          -Liver and gallbladder tests are normal for you. (ALT,AST, Alk phos, bilirubin), kidney function is normal for you (Creatinine, GFR), Sodium is normal, Potassium is normal for you, Calcium is normal for you, Glucose (blood sugar) is normal for you.      -Your complete blood counts including your hemoglobin returned normal for you.           Bottom line:  These results look OK.   Keep working hard at reducing calories and being active to promote weight loss.        Follow up:  Schedule an appointment for a physical examination with fasting blood tests in one year's time, or return sooner if new questions, symptoms or problems arise.        Sincerely,    Dr. Fong  "

## 2022-08-29 ENCOUNTER — E-VISIT (OUTPATIENT)
Dept: FAMILY MEDICINE | Facility: CLINIC | Age: 57
End: 2022-08-29
Payer: COMMERCIAL

## 2022-08-29 DIAGNOSIS — M54.50 ACUTE LOW BACK PAIN, UNSPECIFIED BACK PAIN LATERALITY, UNSPECIFIED WHETHER SCIATICA PRESENT: Primary | ICD-10-CM

## 2022-08-29 PROCEDURE — 99421 OL DIG E/M SVC 5-10 MIN: CPT | Performed by: INTERNAL MEDICINE

## 2022-08-29 RX ORDER — CYCLOBENZAPRINE HCL 10 MG
10 TABLET ORAL 3 TIMES DAILY PRN
Qty: 30 TABLET | Refills: 0 | Status: SHIPPED | OUTPATIENT
Start: 2022-08-29

## 2022-08-29 NOTE — PATIENT INSTRUCTIONS
Sorry to hear that you back is bothering you    You can try the muscle relaxant flexeril, but you should be aware that it can make you very tired    More proven treatments for acute low back pain are application of heat to sore areas of the lower back and taking over the counter antiinflammatories as directed such as ibuprofen or Aleve if tolerated.  If you cannot take antiinflammatories then you could try over the counter Tylenol.    If the pain persists greater than one to two weeks, we should probably see you in person to evaluate further.  We should see you sooner if you have pain radiating down your legs or new leg numbness or weakness or bowel or bladder incontinence.      Sincerely,    Denny Fong MD

## 2022-10-16 ENCOUNTER — HEALTH MAINTENANCE LETTER (OUTPATIENT)
Age: 57
End: 2022-10-16

## 2023-02-16 NOTE — PROCEDURES
Dictated.    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  Successful AVNRT ablation.    EBL = 15 cc.  No apparent complication.     Plan:   - home after 7:45 pm, if doing well   - f/up in EP Clinic in 1-2 months  
Basilio

## 2023-03-26 ENCOUNTER — HEALTH MAINTENANCE LETTER (OUTPATIENT)
Age: 58
End: 2023-03-26

## 2024-03-23 ENCOUNTER — HEALTH MAINTENANCE LETTER (OUTPATIENT)
Age: 59
End: 2024-03-23

## 2024-06-01 ENCOUNTER — HEALTH MAINTENANCE LETTER (OUTPATIENT)
Age: 59
End: 2024-06-01

## 2024-08-20 ENCOUNTER — LAB REQUISITION (OUTPATIENT)
Dept: LAB | Facility: CLINIC | Age: 59
End: 2024-08-20

## 2024-08-20 DIAGNOSIS — Z13.1 ENCOUNTER FOR SCREENING FOR DIABETES MELLITUS: ICD-10-CM

## 2024-08-20 DIAGNOSIS — Z13.220 ENCOUNTER FOR SCREENING FOR LIPOID DISORDERS: ICD-10-CM

## 2024-08-20 LAB
ALBUMIN SERPL BCG-MCNC: 4.5 G/DL (ref 3.5–5.2)
ALP SERPL-CCNC: 90 U/L (ref 40–150)
ALT SERPL W P-5'-P-CCNC: 17 U/L (ref 0–50)
ANION GAP SERPL CALCULATED.3IONS-SCNC: 13 MMOL/L (ref 7–15)
AST SERPL W P-5'-P-CCNC: 23 U/L (ref 0–45)
BILIRUB SERPL-MCNC: 0.7 MG/DL
BUN SERPL-MCNC: 15.5 MG/DL (ref 8–23)
CALCIUM SERPL-MCNC: 9.8 MG/DL (ref 8.8–10.4)
CHLORIDE SERPL-SCNC: 101 MMOL/L (ref 98–107)
CHOLEST SERPL-MCNC: 232 MG/DL
CREAT SERPL-MCNC: 0.98 MG/DL (ref 0.51–0.95)
EGFRCR SERPLBLD CKD-EPI 2021: 66 ML/MIN/1.73M2
FASTING STATUS PATIENT QL REPORTED: ABNORMAL
FASTING STATUS PATIENT QL REPORTED: ABNORMAL
GLUCOSE SERPL-MCNC: 98 MG/DL (ref 70–99)
HCO3 SERPL-SCNC: 23 MMOL/L (ref 22–29)
HDLC SERPL-MCNC: 44 MG/DL
LDLC SERPL CALC-MCNC: 164 MG/DL
NONHDLC SERPL-MCNC: 188 MG/DL
POTASSIUM SERPL-SCNC: 4.5 MMOL/L (ref 3.4–5.3)
PROT SERPL-MCNC: 6.9 G/DL (ref 6.4–8.3)
SODIUM SERPL-SCNC: 137 MMOL/L (ref 135–145)
TRIGL SERPL-MCNC: 118 MG/DL

## 2024-08-20 PROCEDURE — 80053 COMPREHEN METABOLIC PANEL: CPT | Performed by: PHYSICIAN ASSISTANT

## 2024-08-20 PROCEDURE — 80061 LIPID PANEL: CPT | Performed by: PHYSICIAN ASSISTANT

## 2025-06-14 ENCOUNTER — HEALTH MAINTENANCE LETTER (OUTPATIENT)
Age: 60
End: 2025-06-14

## (undated) DEVICE — ENDO TROCAR OPTICAL 12MM VERSAPORT PLUS W/FIX CAN ONB12STF

## (undated) DEVICE — ENDO POUCH GOLD 10MM ECATCH 173050G

## (undated) DEVICE — ESU HOLDER LAP INST DISP PURPLE LONG 330MM H-PRO-330

## (undated) DEVICE — SUCTION CANISTER MEDIVAC LINER 3000ML W/LID 65651-530

## (undated) DEVICE — SUCTION IRR TRUMPET VALVE LAP ASU1201

## (undated) DEVICE — SOL NACL 0.9% IRRIG 1000ML BOTTLE 07138-09

## (undated) DEVICE — ESU FCP OLYMPUS PK CUTTING 5MMX33CM PK-CF0533

## (undated) DEVICE — NDL INSUFFLATION 120MM VERRES 172015

## (undated) DEVICE — SU VICRYL 0 CT-2 27" J334H

## (undated) DEVICE — SU DERMABOND MINI DHVM12

## (undated) DEVICE — GLOVE PROTEXIS W/NEU-THERA 6.5  2D73TE65

## (undated) DEVICE — CATH TRAY FOLEY SURESTEP 16FR W/URINE MTR STATLK LF A303416A

## (undated) DEVICE — SU VICRYL 4-0 PS-2 18" UND J496H

## (undated) DEVICE — GLOVE PROTEXIS W/NEU-THERA 7.0  2D73TE70

## (undated) DEVICE — ENDO TROCAR 05MM VERSAONE BLADED W/STD FIX CANNULA B5STF

## (undated) DEVICE — SOL NACL 0.9% INJ 1000ML BAG 2B1324X

## (undated) DEVICE — DEVICE SUTURE GRASPER TROCAR CLOSURE 14GA PMITCSG

## (undated) DEVICE — ENDO CANNULA 05MM VERSAONE UNIVERSAL UNVCA5STF

## (undated) DEVICE — Device

## (undated) DEVICE — ESU GROUND PAD UNIVERSAL W/O CORD

## (undated) DEVICE — ESU CORD MONOPOLAR 10'  E0510

## (undated) DEVICE — LINEN TOWEL PACK X5 5464

## (undated) RX ORDER — FENTANYL CITRATE 50 UG/ML
INJECTION, SOLUTION INTRAMUSCULAR; INTRAVENOUS
Status: DISPENSED
Start: 2018-03-08

## (undated) RX ORDER — GABAPENTIN 300 MG/1
CAPSULE ORAL
Status: DISPENSED
Start: 2017-07-11

## (undated) RX ORDER — ACETAMINOPHEN 325 MG/1
TABLET ORAL
Status: DISPENSED
Start: 2017-07-11

## (undated) RX ORDER — GLYCOPYRROLATE 0.2 MG/ML
INJECTION, SOLUTION INTRAMUSCULAR; INTRAVENOUS
Status: DISPENSED
Start: 2017-07-11

## (undated) RX ORDER — PROPOFOL 10 MG/ML
INJECTION, EMULSION INTRAVENOUS
Status: DISPENSED
Start: 2017-07-11

## (undated) RX ORDER — ADENOSINE 3 MG/ML
INJECTION, SOLUTION INTRAVENOUS
Status: DISPENSED
Start: 2018-03-08

## (undated) RX ORDER — HEPARIN SODIUM 1000 [USP'U]/ML
INJECTION, SOLUTION INTRAVENOUS; SUBCUTANEOUS
Status: DISPENSED
Start: 2018-03-08

## (undated) RX ORDER — BUPIVACAINE HYDROCHLORIDE AND EPINEPHRINE 2.5; 5 MG/ML; UG/ML
INJECTION, SOLUTION EPIDURAL; INFILTRATION; INTRACAUDAL; PERINEURAL
Status: DISPENSED
Start: 2017-07-11

## (undated) RX ORDER — HYDROMORPHONE HYDROCHLORIDE 1 MG/ML
INJECTION, SOLUTION INTRAMUSCULAR; INTRAVENOUS; SUBCUTANEOUS
Status: DISPENSED
Start: 2017-07-11

## (undated) RX ORDER — LIDOCAINE HYDROCHLORIDE 10 MG/ML
INJECTION, SOLUTION EPIDURAL; INFILTRATION; INTRACAUDAL; PERINEURAL
Status: DISPENSED
Start: 2018-03-08

## (undated) RX ORDER — ONDANSETRON 2 MG/ML
INJECTION INTRAMUSCULAR; INTRAVENOUS
Status: DISPENSED
Start: 2017-07-11

## (undated) RX ORDER — FENTANYL CITRATE 50 UG/ML
INJECTION, SOLUTION INTRAMUSCULAR; INTRAVENOUS
Status: DISPENSED
Start: 2017-07-11

## (undated) RX ORDER — HYDROCODONE BITARTRATE AND ACETAMINOPHEN 5; 325 MG/1; MG/1
TABLET ORAL
Status: DISPENSED
Start: 2017-07-11

## (undated) RX ORDER — LIDOCAINE HYDROCHLORIDE 20 MG/ML
INJECTION, SOLUTION EPIDURAL; INFILTRATION; INTRACAUDAL; PERINEURAL
Status: DISPENSED
Start: 2017-07-11

## (undated) RX ORDER — DEXAMETHASONE SODIUM PHOSPHATE 4 MG/ML
INJECTION, SOLUTION INTRA-ARTICULAR; INTRALESIONAL; INTRAMUSCULAR; INTRAVENOUS; SOFT TISSUE
Status: DISPENSED
Start: 2017-07-11